# Patient Record
Sex: FEMALE | Race: BLACK OR AFRICAN AMERICAN | NOT HISPANIC OR LATINO | Employment: OTHER | URBAN - METROPOLITAN AREA
[De-identification: names, ages, dates, MRNs, and addresses within clinical notes are randomized per-mention and may not be internally consistent; named-entity substitution may affect disease eponyms.]

---

## 2023-05-15 PROBLEM — E55.9 VITAMIN D DEFICIENCY: Status: ACTIVE | Noted: 2023-05-15

## 2023-05-15 PROBLEM — E11.9 DIABETES MELLITUS (MULTI): Status: ACTIVE | Noted: 2023-05-15

## 2023-05-15 PROBLEM — M79.605 CHRONIC PAIN OF LEFT LOWER EXTREMITY: Status: ACTIVE | Noted: 2023-05-15

## 2023-05-15 PROBLEM — R26.2 AMBULATORY DYSFUNCTION: Status: ACTIVE | Noted: 2023-05-15

## 2023-05-15 PROBLEM — M25.551 HIP PAIN, BILATERAL: Status: ACTIVE | Noted: 2023-05-15

## 2023-05-15 PROBLEM — N28.89 RIGHT RENAL MASS: Status: ACTIVE | Noted: 2023-05-15

## 2023-05-15 PROBLEM — L02.211 ABDOMINAL WALL ABSCESS: Status: ACTIVE | Noted: 2023-05-15

## 2023-05-15 PROBLEM — E78.5 HYPERLIPIDEMIA: Status: ACTIVE | Noted: 2023-05-15

## 2023-05-15 PROBLEM — K59.09 CHRONIC CONSTIPATION: Status: ACTIVE | Noted: 2023-05-15

## 2023-05-15 PROBLEM — L73.2 HIDRADENITIS SUPPURATIVA: Status: ACTIVE | Noted: 2023-05-15

## 2023-05-15 PROBLEM — I10 HYPERTENSION: Status: ACTIVE | Noted: 2023-05-15

## 2023-05-15 PROBLEM — N20.0 CALCULUS OF RENAL PELVIS: Status: ACTIVE | Noted: 2023-05-15

## 2023-05-15 PROBLEM — H40.019 OPEN ANGLE GLAUCOMA SUSPECT WITH BORDERLINE FINDINGS AT LOW RISK: Status: ACTIVE | Noted: 2023-05-15

## 2023-05-15 PROBLEM — N18.9 CKD (CHRONIC KIDNEY DISEASE): Status: ACTIVE | Noted: 2023-05-15

## 2023-05-15 PROBLEM — H25.10 AGE-RELATED NUCLEAR CATARACT: Status: ACTIVE | Noted: 2023-05-15

## 2023-05-15 PROBLEM — N20.0 NEPHROLITHIASIS: Status: ACTIVE | Noted: 2023-05-15

## 2023-05-15 PROBLEM — C64.1 RENAL CELL CARCINOMA, RIGHT (MULTI): Status: ACTIVE | Noted: 2023-05-15

## 2023-05-15 PROBLEM — R26.81 UNSTEADY GAIT: Status: ACTIVE | Noted: 2023-05-15

## 2023-05-15 PROBLEM — M54.50 RIGHT-SIDED LOW BACK PAIN WITHOUT SCIATICA: Status: ACTIVE | Noted: 2023-05-15

## 2023-05-15 PROBLEM — N20.0 URIC ACID RENAL CALCULUS: Status: ACTIVE | Noted: 2023-05-15

## 2023-05-15 PROBLEM — G89.29 CHRONIC PAIN OF LEFT LOWER EXTREMITY: Status: ACTIVE | Noted: 2023-05-15

## 2023-05-15 PROBLEM — E66.01 MORBID OBESITY (MULTI): Status: ACTIVE | Noted: 2023-05-15

## 2023-05-15 PROBLEM — M25.552 HIP PAIN, BILATERAL: Status: ACTIVE | Noted: 2023-05-15

## 2023-05-15 RX ORDER — METFORMIN HYDROCHLORIDE 500 MG/1
1 TABLET ORAL DAILY
COMMUNITY
Start: 2022-04-26 | End: 2023-05-16 | Stop reason: SDUPTHER

## 2023-05-15 RX ORDER — BENZOYL PEROXIDE 100 MG/ML
LIQUID TOPICAL
COMMUNITY
Start: 2022-05-04

## 2023-05-15 RX ORDER — LISINOPRIL 10 MG/1
1 TABLET ORAL DAILY
COMMUNITY
Start: 2017-07-06 | End: 2023-05-16 | Stop reason: SDUPTHER

## 2023-05-15 RX ORDER — DOCUSATE SODIUM 100 MG/1
100 CAPSULE, LIQUID FILLED ORAL 2 TIMES DAILY
COMMUNITY
Start: 2019-08-06 | End: 2023-05-16 | Stop reason: SDUPTHER

## 2023-05-15 RX ORDER — ACETAMINOPHEN 500 MG
50 TABLET ORAL DAILY
COMMUNITY
End: 2023-05-16 | Stop reason: SDUPTHER

## 2023-05-15 RX ORDER — SIMVASTATIN 40 MG/1
1 TABLET, FILM COATED ORAL DAILY
COMMUNITY
Start: 2017-07-06 | End: 2023-05-16 | Stop reason: SDUPTHER

## 2023-05-15 RX ORDER — INSULIN GLARGINE 100 [IU]/ML
10 INJECTION, SOLUTION SUBCUTANEOUS NIGHTLY
COMMUNITY
Start: 2022-03-20 | End: 2023-05-16 | Stop reason: SDUPTHER

## 2023-05-15 RX ORDER — HYDROCHLOROTHIAZIDE 25 MG/1
1 TABLET ORAL DAILY
COMMUNITY
Start: 2017-07-06 | End: 2023-05-16 | Stop reason: SDUPTHER

## 2023-05-15 RX ORDER — ASPIRIN 81 MG/1
1 TABLET ORAL DAILY
COMMUNITY
Start: 2017-07-06 | End: 2023-05-16 | Stop reason: SDUPTHER

## 2023-05-15 RX ORDER — CLINDAMYCIN PHOSPHATE 10 UG/ML
LOTION TOPICAL
COMMUNITY
Start: 2022-05-04

## 2023-05-15 RX ORDER — BLOOD-GLUCOSE METER
1 KIT MISCELLANEOUS 3 TIMES DAILY
COMMUNITY
Start: 2022-03-23 | End: 2023-05-16 | Stop reason: SDUPTHER

## 2023-05-16 ENCOUNTER — OFFICE VISIT (OUTPATIENT)
Dept: PRIMARY CARE | Facility: CLINIC | Age: 63
End: 2023-05-16
Payer: COMMERCIAL

## 2023-05-16 VITALS — WEIGHT: 280 LBS | SYSTOLIC BLOOD PRESSURE: 113 MMHG | DIASTOLIC BLOOD PRESSURE: 78 MMHG | BODY MASS INDEX: 42.57 KG/M2

## 2023-05-16 DIAGNOSIS — Z12.39 SCREENING BREAST EXAMINATION: ICD-10-CM

## 2023-05-16 DIAGNOSIS — E55.9 VITAMIN D DEFICIENCY: ICD-10-CM

## 2023-05-16 DIAGNOSIS — Z13.820 SCREENING FOR OSTEOPOROSIS: Primary | ICD-10-CM

## 2023-05-16 DIAGNOSIS — E11.9 TYPE 2 DIABETES MELLITUS WITHOUT COMPLICATION, WITH LONG-TERM CURRENT USE OF INSULIN (MULTI): ICD-10-CM

## 2023-05-16 DIAGNOSIS — K59.09 CHRONIC CONSTIPATION: ICD-10-CM

## 2023-05-16 DIAGNOSIS — Z79.4 TYPE 2 DIABETES MELLITUS WITHOUT COMPLICATION, WITH LONG-TERM CURRENT USE OF INSULIN (MULTI): ICD-10-CM

## 2023-05-16 DIAGNOSIS — I10 HYPERTENSION, UNSPECIFIED TYPE: ICD-10-CM

## 2023-05-16 DIAGNOSIS — E78.5 HYPERLIPIDEMIA, UNSPECIFIED HYPERLIPIDEMIA TYPE: ICD-10-CM

## 2023-05-16 PROCEDURE — 3074F SYST BP LT 130 MM HG: CPT | Performed by: INTERNAL MEDICINE

## 2023-05-16 PROCEDURE — 4010F ACE/ARB THERAPY RXD/TAKEN: CPT | Performed by: INTERNAL MEDICINE

## 2023-05-16 PROCEDURE — 99214 OFFICE O/P EST MOD 30 MIN: CPT | Performed by: INTERNAL MEDICINE

## 2023-05-16 PROCEDURE — 3078F DIAST BP <80 MM HG: CPT | Performed by: INTERNAL MEDICINE

## 2023-05-16 RX ORDER — INSULIN GLARGINE 100 [IU]/ML
10 INJECTION, SOLUTION SUBCUTANEOUS NIGHTLY
Qty: 9 ML | Refills: 1 | Status: SHIPPED | OUTPATIENT
Start: 2023-05-16 | End: 2023-05-18 | Stop reason: SDUPTHER

## 2023-05-16 RX ORDER — HYDROCHLOROTHIAZIDE 25 MG/1
25 TABLET ORAL DAILY
Qty: 90 TABLET | Refills: 1 | Status: SHIPPED | OUTPATIENT
Start: 2023-05-16 | End: 2023-08-17 | Stop reason: SDUPTHER

## 2023-05-16 RX ORDER — METFORMIN HYDROCHLORIDE 500 MG/1
500 TABLET ORAL DAILY
Qty: 90 TABLET | Refills: 1 | Status: SHIPPED | OUTPATIENT
Start: 2023-05-16 | End: 2023-08-17 | Stop reason: SDUPTHER

## 2023-05-16 RX ORDER — DAPAGLIFLOZIN 10 MG/1
10 TABLET, FILM COATED ORAL DAILY
Qty: 90 TABLET | Refills: 1 | Status: SHIPPED
Start: 2023-05-16 | End: 2023-11-09 | Stop reason: SDUPTHER

## 2023-05-16 RX ORDER — BLOOD-GLUCOSE METER
1 KIT MISCELLANEOUS 3 TIMES DAILY
Qty: 100 STRIP | Refills: 3 | Status: SHIPPED
Start: 2023-05-16 | End: 2023-06-22

## 2023-05-16 RX ORDER — DOCUSATE SODIUM 100 MG/1
100 CAPSULE, LIQUID FILLED ORAL 2 TIMES DAILY
Qty: 60 CAPSULE | Refills: 0 | Status: SHIPPED | OUTPATIENT
Start: 2023-05-16

## 2023-05-16 RX ORDER — DEXTROSE 4 G
1 TABLET,CHEWABLE ORAL 2 TIMES DAILY
Qty: 1 EACH | Refills: 0 | Status: SHIPPED | OUTPATIENT
Start: 2023-05-16

## 2023-05-16 RX ORDER — ACETAMINOPHEN 500 MG
50 TABLET ORAL DAILY
Qty: 90 CAPSULE | Refills: 1 | Status: SHIPPED | OUTPATIENT
Start: 2023-05-16 | End: 2023-08-17 | Stop reason: SDUPTHER

## 2023-05-16 RX ORDER — SIMVASTATIN 40 MG/1
40 TABLET, FILM COATED ORAL DAILY
Qty: 90 TABLET | Refills: 1 | Status: SHIPPED | OUTPATIENT
Start: 2023-05-16 | End: 2023-08-02 | Stop reason: SDUPTHER

## 2023-05-16 RX ORDER — ASPIRIN 81 MG/1
81 TABLET ORAL DAILY
Qty: 90 TABLET | Refills: 1 | Status: SHIPPED | OUTPATIENT
Start: 2023-05-16 | End: 2023-08-17 | Stop reason: SDUPTHER

## 2023-05-16 RX ORDER — LISINOPRIL 10 MG/1
10 TABLET ORAL DAILY
Qty: 90 TABLET | Refills: 1 | Status: SHIPPED | OUTPATIENT
Start: 2023-05-16 | End: 2023-08-17 | Stop reason: SDUPTHER

## 2023-05-16 ASSESSMENT — ENCOUNTER SYMPTOMS
ABDOMINAL DISTENTION: 0
ABDOMINAL PAIN: 0
ACTIVITY CHANGE: 0
CONFUSION: 0
COUGH: 0
WHEEZING: 0
FATIGUE: 0
DIARRHEA: 0
VOMITING: 0
LIGHT-HEADEDNESS: 0
SHORTNESS OF BREATH: 0
PALPITATIONS: 0
DIZZINESS: 0
FEVER: 0
NAUSEA: 0
WEAKNESS: 0
AGITATION: 0

## 2023-05-16 NOTE — PROGRESS NOTES
Subjective   Kelsey Rai is a 63 y.o. female with PMH of IDDM2, HTN, right renal mass s/p nephrectomy, recurrent renal stones, and HLD who presents for Med Refill.    The patient is being seen for the initial annual wellness visit and follow up.  Past Medical, Surgical and Family History: reviewed and updated in chart.   Interval History: Patient has not been hospitalized previously.   Medications and Supplements: Review of all medications by a prescribing practitioner or clinical pharmacist (such as prescriptions, OTCs, herbal therapies and supplements) documented in the medical record.    No, the patient is not using opioids.   Health Risk Assessment:. Paper HRA completed by patient and scanned into chart.   Depression/Suicide Screening:  .   Done  No falls in the past year.  No recent hospitalizations.  Advance care planning completed.     Patient states that she is doing well overall with no complaints. Highest sugars at home have been in the 190s. Not checking BP at home.     Med Refill  Pertinent negatives include no abdominal pain, chest pain, coughing, fatigue, fever, nausea, vomiting or weakness.       Review of Systems   Constitutional:  Negative for activity change, fatigue and fever.   Respiratory:  Negative for cough, shortness of breath and wheezing.    Cardiovascular:  Negative for chest pain, palpitations and leg swelling.   Gastrointestinal:  Negative for abdominal distention, abdominal pain, diarrhea, nausea and vomiting.   Neurological:  Negative for dizziness, weakness and light-headedness.   Psychiatric/Behavioral:  Negative for agitation and confusion.        Objective   Physical Exam  Constitutional:       General: She is not in acute distress.     Appearance: Normal appearance.   Cardiovascular:      Rate and Rhythm: Normal rate and regular rhythm.      Heart sounds: Normal heart sounds. No murmur heard.     No friction rub. No gallop.   Pulmonary:      Effort: Pulmonary effort is  "normal. No respiratory distress.      Breath sounds: Normal breath sounds. No wheezing, rhonchi or rales.   Abdominal:      General: Abdomen is flat. Bowel sounds are normal. There is no distension.      Palpations: Abdomen is soft.      Tenderness: There is no abdominal tenderness.   Musculoskeletal:         General: No swelling or tenderness. Normal range of motion.   Skin:     General: Skin is warm and dry.      Findings: No erythema or rash.   Neurological:      General: No focal deficit present.      Mental Status: She is alert and oriented to person, place, and time. Mental status is at baseline.   Psychiatric:         Mood and Affect: Mood normal.         Behavior: Behavior normal.         Assessment/Plan   Problem List Items Addressed This Visit          Circulatory    Hypertension     - BP today in office 113/78   - Continue home meds   - Check CMP + urine albumin          Relevant Medications    aspirin 81 mg EC tablet    hydroCHLOROthiazide (HYDRODiuril) 25 mg tablet    lisinopril 10 mg tablet    Other Relevant Orders    Lipid Panel    Comprehensive Metabolic Panel    Albumin, urine, random       Digestive    Chronic constipation    Relevant Medications    docusate sodium (Colace) 100 mg capsule       Endocrine/Metabolic    Diabetes mellitus (CMS/HCC)     - Check HbA1c   - Continue Lantus 10 units at night   - Continue Metformin 500mg once daily          Relevant Medications    FreeStyle Lite Strips strip    insulin glargine (Lantus U-100 Insulin) 100 unit/mL injection    metFORMIN (Glucophage) 500 mg tablet    dapagliflozin (Farxiga) 10 mg    blood-glucose meter (OneTouch Verio Meter) misc    insulin syringe,safetyneedle 29G X 1/2\" 0.5 mL syringe    Other Relevant Orders    Hemoglobin A1C    Vitamin D deficiency    Relevant Medications    cholecalciferol (Vitamin D-3) 50 mcg (2,000 unit) capsule       Other    Hyperlipidemia     - Check CMP + lipid panel   - Continue home meds          Relevant " Medications    simvastatin (Zocor) 40 mg tablet    Other Relevant Orders    Lipid Panel    Comprehensive Metabolic Panel     Other Visit Diagnoses       Screening for osteoporosis    -  Primary    Relevant Orders    XR DEXA bone density    Screening breast examination        Relevant Orders    BI mammo bilateral screening tomosynthesis          # Health Maintenance   - Last colonoscopy 2014 and recommended repeat in 8 years, pt wants to defer until 2024   - Last mammogram 2022, referral given   - DEXA scan referral given   - Check HbA1c, CMP, lipids, urine albumin

## 2023-05-17 DIAGNOSIS — E11.9 TYPE 2 DIABETES MELLITUS WITHOUT COMPLICATION, WITH LONG-TERM CURRENT USE OF INSULIN (MULTI): ICD-10-CM

## 2023-05-17 DIAGNOSIS — E66.01 MORBID OBESITY (MULTI): Primary | ICD-10-CM

## 2023-05-17 DIAGNOSIS — Z79.4 TYPE 2 DIABETES MELLITUS WITHOUT COMPLICATION, WITH LONG-TERM CURRENT USE OF INSULIN (MULTI): ICD-10-CM

## 2023-05-17 NOTE — TELEPHONE ENCOUNTER
Insulin was changed to a pen instead of the vial she is used to would like to talk to you about it

## 2023-05-18 DIAGNOSIS — E11.9 TYPE 2 DIABETES MELLITUS WITHOUT COMPLICATION, WITH LONG-TERM CURRENT USE OF INSULIN (MULTI): ICD-10-CM

## 2023-05-18 DIAGNOSIS — Z79.4 TYPE 2 DIABETES MELLITUS WITHOUT COMPLICATION, WITH LONG-TERM CURRENT USE OF INSULIN (MULTI): ICD-10-CM

## 2023-05-18 RX ORDER — INSULIN GLARGINE 100 [IU]/ML
12 INJECTION, SOLUTION SUBCUTANEOUS NIGHTLY
Qty: 12 ML | Refills: 1 | Status: SHIPPED | OUTPATIENT
Start: 2023-05-18 | End: 2023-07-21

## 2023-05-23 DIAGNOSIS — Z79.4 TYPE 2 DIABETES MELLITUS WITH CHRONIC KIDNEY DISEASE, WITH LONG-TERM CURRENT USE OF INSULIN, UNSPECIFIED CKD STAGE (MULTI): Primary | ICD-10-CM

## 2023-05-23 DIAGNOSIS — E11.22 TYPE 2 DIABETES MELLITUS WITH CHRONIC KIDNEY DISEASE, WITH LONG-TERM CURRENT USE OF INSULIN, UNSPECIFIED CKD STAGE (MULTI): Primary | ICD-10-CM

## 2023-05-23 RX ORDER — INSULIN PUMP SYRINGE, 3 ML
EACH MISCELLANEOUS
Qty: 1 EACH | Refills: 0 | Status: SHIPPED
Start: 2023-05-23 | End: 2023-06-22

## 2023-05-23 RX ORDER — LANCETS
EACH MISCELLANEOUS
Qty: 100 EACH | Refills: 1 | Status: SHIPPED | OUTPATIENT
Start: 2023-05-23 | End: 2023-08-17 | Stop reason: SDUPTHER

## 2023-05-23 RX ORDER — LANCETS 26 GAUGE
EACH MISCELLANEOUS
Qty: 1 EACH | Refills: 0 | Status: SHIPPED | OUTPATIENT
Start: 2023-05-23 | End: 2024-05-22

## 2023-06-22 DIAGNOSIS — E11.9 TYPE 2 DIABETES MELLITUS WITHOUT COMPLICATION, WITH LONG-TERM CURRENT USE OF INSULIN (MULTI): Primary | ICD-10-CM

## 2023-06-22 DIAGNOSIS — Z79.4 TYPE 2 DIABETES MELLITUS WITHOUT COMPLICATION, WITH LONG-TERM CURRENT USE OF INSULIN (MULTI): Primary | ICD-10-CM

## 2023-06-22 RX ORDER — BLOOD-GLUCOSE METER
EACH MISCELLANEOUS
Qty: 200 STRIP | Refills: 1 | Status: SHIPPED | OUTPATIENT
Start: 2023-06-22 | End: 2023-11-21

## 2023-06-22 RX ORDER — BLOOD-GLUCOSE METER
EACH MISCELLANEOUS
COMMUNITY
Start: 2022-08-12 | End: 2023-06-22 | Stop reason: SDUPTHER

## 2023-07-06 LAB
CREATININE (MG/DL) IN SER/PLAS: 1.29 MG/DL (ref 0.5–1.05)
GFR FEMALE: 46 ML/MIN/1.73M2
UREA NITROGEN (MG/DL) IN SER/PLAS: 25 MG/DL (ref 6–23)

## 2023-07-21 DIAGNOSIS — E11.9 TYPE 2 DIABETES MELLITUS WITHOUT COMPLICATION, WITH LONG-TERM CURRENT USE OF INSULIN (MULTI): ICD-10-CM

## 2023-07-21 DIAGNOSIS — Z79.4 TYPE 2 DIABETES MELLITUS WITHOUT COMPLICATION, WITH LONG-TERM CURRENT USE OF INSULIN (MULTI): ICD-10-CM

## 2023-07-21 RX ORDER — INSULIN GLARGINE 100 [IU]/ML
INJECTION, SOLUTION SUBCUTANEOUS
Qty: 12 ML | Refills: 1 | Status: SHIPPED | OUTPATIENT
Start: 2023-07-21 | End: 2023-08-17 | Stop reason: SDUPTHER

## 2023-08-02 DIAGNOSIS — E78.5 HYPERLIPIDEMIA, UNSPECIFIED HYPERLIPIDEMIA TYPE: ICD-10-CM

## 2023-08-03 RX ORDER — SIMVASTATIN 40 MG/1
40 TABLET, FILM COATED ORAL DAILY
Qty: 90 TABLET | Refills: 1 | Status: SHIPPED | OUTPATIENT
Start: 2023-08-03 | End: 2023-08-17 | Stop reason: SDUPTHER

## 2023-08-17 ENCOUNTER — OFFICE VISIT (OUTPATIENT)
Dept: PRIMARY CARE | Facility: CLINIC | Age: 63
End: 2023-08-17
Payer: COMMERCIAL

## 2023-08-17 VITALS — SYSTOLIC BLOOD PRESSURE: 104 MMHG | WEIGHT: 278 LBS | DIASTOLIC BLOOD PRESSURE: 72 MMHG | BODY MASS INDEX: 42.27 KG/M2

## 2023-08-17 DIAGNOSIS — E55.9 VITAMIN D DEFICIENCY: ICD-10-CM

## 2023-08-17 DIAGNOSIS — E11.22 TYPE 2 DIABETES MELLITUS WITH CHRONIC KIDNEY DISEASE, WITH LONG-TERM CURRENT USE OF INSULIN, UNSPECIFIED CKD STAGE (MULTI): ICD-10-CM

## 2023-08-17 DIAGNOSIS — E11.9 TYPE 2 DIABETES MELLITUS WITHOUT COMPLICATION, WITH LONG-TERM CURRENT USE OF INSULIN (MULTI): ICD-10-CM

## 2023-08-17 DIAGNOSIS — I10 HYPERTENSION, UNSPECIFIED TYPE: ICD-10-CM

## 2023-08-17 DIAGNOSIS — E78.5 HYPERLIPIDEMIA, UNSPECIFIED HYPERLIPIDEMIA TYPE: ICD-10-CM

## 2023-08-17 DIAGNOSIS — Z79.4 TYPE 2 DIABETES MELLITUS WITHOUT COMPLICATION, WITH LONG-TERM CURRENT USE OF INSULIN (MULTI): ICD-10-CM

## 2023-08-17 DIAGNOSIS — Z79.4 TYPE 2 DIABETES MELLITUS WITH CHRONIC KIDNEY DISEASE, WITH LONG-TERM CURRENT USE OF INSULIN, UNSPECIFIED CKD STAGE (MULTI): ICD-10-CM

## 2023-08-17 PROCEDURE — 4010F ACE/ARB THERAPY RXD/TAKEN: CPT | Performed by: INTERNAL MEDICINE

## 2023-08-17 PROCEDURE — G0439 PPPS, SUBSEQ VISIT: HCPCS | Performed by: INTERNAL MEDICINE

## 2023-08-17 PROCEDURE — 3078F DIAST BP <80 MM HG: CPT | Performed by: INTERNAL MEDICINE

## 2023-08-17 PROCEDURE — 3074F SYST BP LT 130 MM HG: CPT | Performed by: INTERNAL MEDICINE

## 2023-08-17 PROCEDURE — 99214 OFFICE O/P EST MOD 30 MIN: CPT | Performed by: INTERNAL MEDICINE

## 2023-08-17 RX ORDER — INSULIN GLARGINE 100 [IU]/ML
12 INJECTION, SOLUTION SUBCUTANEOUS NIGHTLY
Qty: 12 ML | Refills: 1 | Status: SHIPPED | OUTPATIENT
Start: 2023-08-17 | End: 2023-08-17 | Stop reason: SDUPTHER

## 2023-08-17 RX ORDER — SIMVASTATIN 40 MG/1
40 TABLET, FILM COATED ORAL DAILY
Qty: 90 TABLET | Refills: 1 | Status: SHIPPED | OUTPATIENT
Start: 2023-08-17 | End: 2023-08-17 | Stop reason: SDUPTHER

## 2023-08-17 RX ORDER — ASPIRIN 81 MG/1
81 TABLET ORAL DAILY
Qty: 90 TABLET | Refills: 1 | Status: SHIPPED | OUTPATIENT
Start: 2023-08-17 | End: 2023-12-15 | Stop reason: SDUPTHER

## 2023-08-17 RX ORDER — LANCETS
EACH MISCELLANEOUS
Qty: 100 EACH | Refills: 1 | Status: SHIPPED | OUTPATIENT
Start: 2023-08-17 | End: 2023-08-17 | Stop reason: SDUPTHER

## 2023-08-17 RX ORDER — SIMVASTATIN 40 MG/1
40 TABLET, FILM COATED ORAL DAILY
Qty: 90 TABLET | Refills: 1 | Status: SHIPPED
Start: 2023-08-17 | End: 2023-09-12

## 2023-08-17 RX ORDER — HYDROCHLOROTHIAZIDE 25 MG/1
25 TABLET ORAL DAILY
Qty: 90 TABLET | Refills: 1 | Status: SHIPPED | OUTPATIENT
Start: 2023-08-17 | End: 2023-08-17 | Stop reason: SDUPTHER

## 2023-08-17 RX ORDER — PEN NEEDLE, DIABETIC 30 GX3/16"
NEEDLE, DISPOSABLE MISCELLANEOUS
Qty: 100 EACH | Refills: 1 | Status: SHIPPED | OUTPATIENT
Start: 2023-08-17 | End: 2023-10-10 | Stop reason: SDUPTHER

## 2023-08-17 RX ORDER — LANCETS
EACH MISCELLANEOUS
Qty: 100 EACH | Refills: 1 | Status: SHIPPED
Start: 2023-08-17 | End: 2024-02-09 | Stop reason: WASHOUT

## 2023-08-17 RX ORDER — INSULIN GLARGINE 100 [IU]/ML
12 INJECTION, SOLUTION SUBCUTANEOUS NIGHTLY
Qty: 12 ML | Refills: 1 | Status: SHIPPED | OUTPATIENT
Start: 2023-08-17 | End: 2023-10-10 | Stop reason: SDUPTHER

## 2023-08-17 RX ORDER — INSULIN GLARGINE 100 [IU]/ML
12 INJECTION, SOLUTION SUBCUTANEOUS NIGHTLY
Qty: 12 ML | Refills: 1 | Status: SHIPPED
Start: 2023-08-17 | End: 2023-08-17 | Stop reason: WASHOUT

## 2023-08-17 RX ORDER — METFORMIN HYDROCHLORIDE 500 MG/1
500 TABLET ORAL DAILY
Qty: 90 TABLET | Refills: 1 | Status: SHIPPED
Start: 2023-08-17 | End: 2023-11-09 | Stop reason: ALTCHOICE

## 2023-08-17 RX ORDER — LISINOPRIL 10 MG/1
10 TABLET ORAL DAILY
Qty: 90 TABLET | Refills: 1 | Status: SHIPPED | OUTPATIENT
Start: 2023-08-17 | End: 2023-12-15 | Stop reason: SDUPTHER

## 2023-08-17 RX ORDER — ACETAMINOPHEN 500 MG
50 TABLET ORAL DAILY
Qty: 90 CAPSULE | Refills: 1 | Status: SHIPPED | OUTPATIENT
Start: 2023-08-17 | End: 2023-08-17 | Stop reason: SDUPTHER

## 2023-08-17 RX ORDER — LISINOPRIL 10 MG/1
10 TABLET ORAL DAILY
Qty: 90 TABLET | Refills: 1 | Status: SHIPPED | OUTPATIENT
Start: 2023-08-17 | End: 2023-08-17 | Stop reason: SDUPTHER

## 2023-08-17 RX ORDER — METFORMIN HYDROCHLORIDE 500 MG/1
500 TABLET ORAL DAILY
Qty: 90 TABLET | Refills: 1 | Status: SHIPPED | OUTPATIENT
Start: 2023-08-17 | End: 2023-08-17 | Stop reason: SDUPTHER

## 2023-08-17 RX ORDER — ASPIRIN 81 MG/1
81 TABLET ORAL DAILY
Qty: 90 TABLET | Refills: 1 | Status: SHIPPED | OUTPATIENT
Start: 2023-08-17 | End: 2023-08-17 | Stop reason: SDUPTHER

## 2023-08-17 RX ORDER — ACETAMINOPHEN 500 MG
50 TABLET ORAL DAILY
Qty: 90 CAPSULE | Refills: 1 | Status: SHIPPED | OUTPATIENT
Start: 2023-08-17 | End: 2023-12-15 | Stop reason: SDUPTHER

## 2023-08-17 RX ORDER — HYDROCHLOROTHIAZIDE 25 MG/1
25 TABLET ORAL DAILY
Qty: 90 TABLET | Refills: 1 | Status: SHIPPED | OUTPATIENT
Start: 2023-08-17 | End: 2023-12-15 | Stop reason: SDUPTHER

## 2023-08-17 ASSESSMENT — ENCOUNTER SYMPTOMS
OCCASIONAL FEELINGS OF UNSTEADINESS: 0
LOSS OF SENSATION IN FEET: 0
DEPRESSION: 0

## 2023-08-17 NOTE — PROGRESS NOTES
Subjective   Patient ID: Kelsey Rai is a 63 y.o. female who presents for Follow-up (Needs rx for pen needles for  lantus pen).    The patient is being seen for the subsequent annual wellness visit and follow up.  Past Medical, Surgical and Family History: reviewed and updated in chart.   Interval History: Patient has not been hospitalized previously.   Medications and Supplements: Review of all medications by a prescribing practitioner or clinical pharmacist (such as prescriptions, OTCs, herbal therapies and supplements) documented in the medical record.    No, the patient is not using opioids.   Health Risk Assessment:. Paper HRA completed by patient and scanned into chart.   Depression/Suicide Screening:  .   Done  No falls in the past year.  No recent hospitalizations.  Advance care planning completed.     HPI  The pt is a 63 year old female with a pmh of T2DM, HTN, HLD, rt renal mass s/p nephrectomy, recurrent nephrolithiasis, and CKD stage 3a who presented to the clinic today for refill and follow up. She has no new complaints today.     She reports no hypoglycemic episodes at home, blood sugar test at home is 121-160s. She however does not check blood pressures at home, BP has been relatively controlled on medications. She has appointment with podiatrist on the 8/21 and has regularly ophthalmology visits.  The patient denies headaches, lightheadedness, changes in speech/vision, photophobia, dysphagia, diaphoresis, Chest pain, dyspnea, Abdominal pain, recent falls or trauma, and changes in bowel/urinary habits.    Allergies   Allergen Reactions    Banana Hives and Itching    Latex Unknown    Penicillins Itching     tolerated ceftriaxone 3/16/22. See allergy assessment from 3/18/2022      Current Outpatient Medications on File Prior to Visit   Medication Sig Dispense Refill    Autolet lancing device Use as instructed 1 each 0    benzoyl peroxide (Benzac AC) 10 % external wash apply to affected areas on  "body, let sit for 2-3 minutes and then rinse off. May bleach fabrics. Use daily when in the shower.      blood sugar diagnostic (OneTouch Verio test strips) strip TEST TWO TIMES A  strip 1    blood-glucose meter (OneTouch Verio Meter) misc 1 each 2 times a day. 1 each 0    clindamycin (Cleocin T) 1 % lotion apply to affected areas under the breasts, groin area and buttocks twice daily      dapagliflozin (Farxiga) 10 mg Take 1 tablet (10 mg) by mouth once daily. 90 tablet 1    docusate sodium (Colace) 100 mg capsule Take 1 capsule (100 mg) by mouth 2 times a day. 60 capsule 0    [DISCONTINUED] aspirin 81 mg EC tablet Take 1 tablet (81 mg) by mouth once daily. 90 tablet 1    [DISCONTINUED] cholecalciferol (Vitamin D-3) 50 mcg (2,000 unit) capsule Take 1 capsule (50 mcg) by mouth early in the morning.. 90 capsule 1    [DISCONTINUED] hydroCHLOROthiazide (HYDRODiuril) 25 mg tablet Take 1 tablet (25 mg) by mouth once daily. 90 tablet 1    [DISCONTINUED] insulin syringe,safetyneedle 29G X 1/2\" 0.5 mL syringe Use to inject 1-4 times daily as directed. 100 each 6    [DISCONTINUED] lancets misc Test blood sugar twice daily 100 each 1    [DISCONTINUED] Lantus U-100 Insulin 100 unit/mL injection INJECT 12 UNITS UNDER THE SKIN ONCE DAILY AT BEDTIME 12 mL 1    [DISCONTINUED] lisinopril 10 mg tablet Take 1 tablet (10 mg) by mouth once daily. 90 tablet 1    [DISCONTINUED] metFORMIN (Glucophage) 500 mg tablet Take 1 tablet (500 mg) by mouth once daily. 90 tablet 1    [DISCONTINUED] simvastatin (Zocor) 40 mg tablet Take 1 tablet (40 mg) by mouth once daily. 90 tablet 1     No current facility-administered medications on file prior to visit.      Social History    Tobacco Use      Smoking status: Former        Types: Cigarettes        Quit date:         Years since quittin.6      Smokeless tobacco: Never  .  reports no history of alcohol use..  reports no history of drug use..     Review of Systems  12 points review of " system is negative except as stated in the HPI above.    Objective   /72   Wt 126 kg (278 lb)   BMI 42.27 kg/m²     Physical Exam  GE; sitting comfortably in a chair, in NAD  HEENT; AT/NC, no conjunctival pallor, anicteric sclera  Neck; Supple neck, no LAD/JVD  Chest; CTAbl, no adventitious sounds  CVS; s1 and s2 only no MGR, RRR  Abd; soft, NT/ND, normoactive BS  Ext; no cyanosis/clubbing/edema, pulses intact  Neuro; Aox3  Psych; normal mood    [unfilled]  Lab Results   Component Value Date    HGB 12.7 11/22/2022    HGB 12.7 07/27/2022    MCV 92 11/22/2022    MCV 90 07/27/2022     11/22/2022     07/27/2022    BUN 25 (H) 07/06/2023    BUN 19 11/22/2022    CREATININE 1.29 (H) 07/06/2023    CREATININE 1.13 (H) 11/22/2022    TSH 0.70 04/23/2022    VITD25 55 04/23/2022         Assessment/Plan   he pt is a 63 year old female with a pmh of T2DM, HTN, HLD, rt renal mass s/p nephrectomy, recurrent nephrolithiasis, and CKD stage 3a who came in for a routine followup and refill of medications.    For DM  Home blood sugar measurements 120-160s  No hypoglycemic episodes  Continue lantus 12 at night, continue metformin  Continue diet and exercise  Ordered A1c, will review results     For HLD  Continue simvastatin 40  Ordered Lipid panel  Continue diet and exercise regimen for wt loss    For CKD stage 3a, renal mass s/p nephrectomy  sCr 1.29, GFR 46  Avoid nephrotoxic medications including NSAIDS  Encourage liberal intake of fluid, no salt    For HTN  Bp today 104/72  No home bp measurement, usually wnl  Continue hydrochlorothiazide 25, lisinopril 10, ASA 81    HM  Had colonoscopy 2014, next time 2024  Mammography done 7/18 wnl  Dexa done this year, normal findings    Outstanding labs; A1c, CMP, Lipid panel, UA. Asked to get this done, will review results at the next appointment.     Problem List Items Addressed This Visit       Diabetes mellitus (CMS/[facility])    Relevant Medications    insulin glargine (Lantus  "U-100 Insulin) 100 unit/mL injection    insulin syringe,safetyneedle 29G X 1/2\" 0.5 mL syringe    lancets misc    metFORMIN (Glucophage) 500 mg tablet    Hyperlipidemia    Relevant Medications    simvastatin (Zocor) 40 mg tablet    Hypertension    Relevant Medications    aspirin 81 mg EC tablet    hydroCHLOROthiazide (HYDRODiuril) 25 mg tablet    lisinopril 10 mg tablet    Vitamin D deficiency    Relevant Medications    cholecalciferol (Vitamin D-3) 50 mcg (2,000 unit) capsule              Manjeet Donis MD  IM Resident.  "

## 2023-09-01 ENCOUNTER — LAB (OUTPATIENT)
Dept: LAB | Facility: LAB | Age: 63
End: 2023-09-01
Payer: COMMERCIAL

## 2023-09-01 DIAGNOSIS — E11.9 TYPE 2 DIABETES MELLITUS WITHOUT COMPLICATION, WITH LONG-TERM CURRENT USE OF INSULIN (MULTI): ICD-10-CM

## 2023-09-01 DIAGNOSIS — Z79.4 TYPE 2 DIABETES MELLITUS WITHOUT COMPLICATION, WITH LONG-TERM CURRENT USE OF INSULIN (MULTI): ICD-10-CM

## 2023-09-01 DIAGNOSIS — I10 HYPERTENSION, UNSPECIFIED TYPE: ICD-10-CM

## 2023-09-01 DIAGNOSIS — E78.5 HYPERLIPIDEMIA, UNSPECIFIED HYPERLIPIDEMIA TYPE: ICD-10-CM

## 2023-09-01 LAB
ALANINE AMINOTRANSFERASE (SGPT) (U/L) IN SER/PLAS: 9 U/L (ref 7–45)
ALBUMIN (G/DL) IN SER/PLAS: 3.8 G/DL (ref 3.4–5)
ALBUMIN (MG/L) IN URINE: 10.4 MG/L
ALBUMIN/CREATININE (UG/MG) IN URINE: 7.6 UG/MG CRT (ref 0–30)
ALKALINE PHOSPHATASE (U/L) IN SER/PLAS: 55 U/L (ref 33–136)
ANION GAP IN SER/PLAS: 14 MMOL/L (ref 10–20)
ASPARTATE AMINOTRANSFERASE (SGOT) (U/L) IN SER/PLAS: 11 U/L (ref 9–39)
BILIRUBIN TOTAL (MG/DL) IN SER/PLAS: 0.5 MG/DL (ref 0–1.2)
CALCIUM (MG/DL) IN SER/PLAS: 9.6 MG/DL (ref 8.6–10.6)
CARBON DIOXIDE, TOTAL (MMOL/L) IN SER/PLAS: 28 MMOL/L (ref 21–32)
CHLORIDE (MMOL/L) IN SER/PLAS: 102 MMOL/L (ref 98–107)
CHOLESTEROL (MG/DL) IN SER/PLAS: 151 MG/DL (ref 0–199)
CHOLESTEROL IN HDL (MG/DL) IN SER/PLAS: 36 MG/DL
CHOLESTEROL/HDL RATIO: 4.2
CREATININE (MG/DL) IN SER/PLAS: 1.24 MG/DL (ref 0.5–1.05)
CREATININE (MG/DL) IN URINE: 137 MG/DL (ref 20–320)
ESTIMATED AVERAGE GLUCOSE FOR HBA1C: 180 MG/DL
GFR FEMALE: 48 ML/MIN/1.73M2
GLUCOSE (MG/DL) IN SER/PLAS: 150 MG/DL (ref 74–99)
HEMOGLOBIN A1C/HEMOGLOBIN TOTAL IN BLOOD: 7.9 %
LDL: 91 MG/DL (ref 0–99)
POTASSIUM (MMOL/L) IN SER/PLAS: 4.1 MMOL/L (ref 3.5–5.3)
PROTEIN TOTAL: 7.5 G/DL (ref 6.4–8.2)
SODIUM (MMOL/L) IN SER/PLAS: 140 MMOL/L (ref 136–145)
TRIGLYCERIDE (MG/DL) IN SER/PLAS: 120 MG/DL (ref 0–149)
UREA NITROGEN (MG/DL) IN SER/PLAS: 17 MG/DL (ref 6–23)
VLDL: 24 MG/DL (ref 0–40)

## 2023-09-01 PROCEDURE — 83036 HEMOGLOBIN GLYCOSYLATED A1C: CPT

## 2023-09-01 PROCEDURE — 80061 LIPID PANEL: CPT

## 2023-09-01 PROCEDURE — 36415 COLL VENOUS BLD VENIPUNCTURE: CPT

## 2023-09-01 PROCEDURE — 82570 ASSAY OF URINE CREATININE: CPT

## 2023-09-01 PROCEDURE — 80053 COMPREHEN METABOLIC PANEL: CPT

## 2023-09-01 PROCEDURE — 82043 UR ALBUMIN QUANTITATIVE: CPT

## 2023-09-05 DIAGNOSIS — E11.22 TYPE 2 DIABETES MELLITUS WITH CHRONIC KIDNEY DISEASE, WITH LONG-TERM CURRENT USE OF INSULIN, UNSPECIFIED CKD STAGE (MULTI): ICD-10-CM

## 2023-09-05 DIAGNOSIS — Z79.4 TYPE 2 DIABETES MELLITUS WITH CHRONIC KIDNEY DISEASE, WITH LONG-TERM CURRENT USE OF INSULIN, UNSPECIFIED CKD STAGE (MULTI): ICD-10-CM

## 2023-09-05 RX ORDER — LANCETS 33 GAUGE
EACH MISCELLANEOUS
Qty: 100 EACH | Refills: 1 | Status: SHIPPED | OUTPATIENT
Start: 2023-09-05 | End: 2024-03-22 | Stop reason: SDUPTHER

## 2023-09-12 DIAGNOSIS — E11.9 TYPE 2 DIABETES MELLITUS WITHOUT COMPLICATION, WITH LONG-TERM CURRENT USE OF INSULIN (MULTI): Primary | ICD-10-CM

## 2023-09-12 DIAGNOSIS — Z79.4 TYPE 2 DIABETES MELLITUS WITHOUT COMPLICATION, WITH LONG-TERM CURRENT USE OF INSULIN (MULTI): Primary | ICD-10-CM

## 2023-09-12 DIAGNOSIS — E78.5 HYPERLIPIDEMIA, UNSPECIFIED HYPERLIPIDEMIA TYPE: ICD-10-CM

## 2023-09-12 RX ORDER — ATORVASTATIN CALCIUM 80 MG/1
80 TABLET, FILM COATED ORAL DAILY
Qty: 30 TABLET | Refills: 5 | Status: SHIPPED
Start: 2023-09-12 | End: 2023-09-19 | Stop reason: SINTOL

## 2023-09-14 ENCOUNTER — TELEPHONE (OUTPATIENT)
Dept: PRIMARY CARE | Facility: CLINIC | Age: 63
End: 2023-09-14
Payer: COMMERCIAL

## 2023-09-18 ENCOUNTER — TELEPHONE (OUTPATIENT)
Dept: PRIMARY CARE | Facility: CLINIC | Age: 63
End: 2023-09-18
Payer: COMMERCIAL

## 2023-09-18 NOTE — TELEPHONE ENCOUNTER
[facility] CALLED.   PATIENT IS TAKING A STATIN AND HER DOSE WAS RECENTLY INCREASED.   tHE PILL IS TOO BIG AND SHE IS HAVING DIFFICULTY SWALLOWING.  What to do?

## 2023-09-19 DIAGNOSIS — E78.5 HYPERLIPIDEMIA, UNSPECIFIED HYPERLIPIDEMIA TYPE: Primary | ICD-10-CM

## 2023-09-19 RX ORDER — SIMVASTATIN 40 MG/1
1 TABLET, FILM COATED ORAL DAILY
COMMUNITY
Start: 2017-07-06 | End: 2023-09-19 | Stop reason: SDUPTHER

## 2023-09-19 RX ORDER — SIMVASTATIN 40 MG/1
40 TABLET, FILM COATED ORAL DAILY
Qty: 90 TABLET | Refills: 1 | Status: SHIPPED
Start: 2023-09-19 | End: 2024-02-09 | Stop reason: ALTCHOICE

## 2023-09-27 ENCOUNTER — TELEMEDICINE (OUTPATIENT)
Dept: PHARMACY | Facility: HOSPITAL | Age: 63
End: 2023-09-27
Payer: COMMERCIAL

## 2023-09-27 DIAGNOSIS — Z79.4 TYPE 2 DIABETES MELLITUS WITHOUT COMPLICATION, WITH LONG-TERM CURRENT USE OF INSULIN (MULTI): ICD-10-CM

## 2023-09-27 DIAGNOSIS — E11.9 TYPE 2 DIABETES MELLITUS WITHOUT COMPLICATION, WITH LONG-TERM CURRENT USE OF INSULIN (MULTI): ICD-10-CM

## 2023-09-27 ASSESSMENT — ENCOUNTER SYMPTOMS: DIABETIC ASSOCIATED SYMPTOMS: 0

## 2023-09-27 NOTE — Clinical Note
Discussed potential start of Trulicity. She would like to discuss with her daughter. F/U with pharm 2 weeks; PCP 12/4

## 2023-09-27 NOTE — PROGRESS NOTES
Pharmacist Clinic: Diabetes Management  Kelsey Rai is a 63 y.o. female was referred to Clinical Pharmacy Team for diabetes management.   Referring Provider: Ora Ramirez,      Diabetes  She presents for her initial diabetic visit. She has type 2 diabetes mellitus. Her disease course has been stable. There are no hypoglycemic associated symptoms. There are no diabetic associated symptoms. Risk factors for coronary artery disease include diabetes mellitus, dyslipidemia, hypertension and obesity.     HISTORY OF PRESENT ILLNESS  Approximate Date of Diagnosis: Does not recall  Reports no known complications due to diabetes. Does have h/o CKD, renal mass s/p nephrectomy.     Pertinent PMH Review:  - PMH of Pancreatitis: no  - PMH/FH of Medullary Thyroid Cancer: no  - PMH of Retinopathy: no  - PMH of Urinary Tract Infections: no    Diet: 2-3 meals/day. Dinner is largest meal -chicken and veggie  Limits snacks and sweets.  Drinks lemon water. Avoids soda.    Denies tobacco or alcohol use    LAB REVIEW   Lab Results   Component Value Date    CREATININE 1.24 (H) 09/01/2023      Lab Results   Component Value Date    HGBA1C 7.9 (A) 09/01/2023    HGBA1C 8.2 (A) 11/22/2022    HGBA1C 7.5 (A) 03/24/2022     Lab Results   Component Value Date    TRIG 120 09/01/2023    TRIG 146 11/22/2022    TRIG 111 08/20/2021     The 10-year ASCVD risk score (Zenia BARILLAS, et al., 2019) is: 11.1%    Values used to calculate the score:      Age: 63 years      Sex: Female      Is Non- : Yes      Diabetic: Yes      Tobacco smoker: No      Systolic Blood Pressure: 104 mmHg      Is BP treated: Yes      HDL Cholesterol: 36 mg/dL      Total Cholesterol: 151 mg/dL     DIABETES ASSESSMENT  CURRENT PHARMACOTHERAPY  - Farxiga 10mg once daily   - Metformin 500mg once daily  - Lantus 12 units once daily    Allergies: Banana, Latex, and Penicillins     SECONDARY PREVENTION  - Statin? Yes  - ACE-I/ARB? Yes    HISTORICAL  PHARMACOTHERAPY  - None    SMBG MEASUREMENTS  Patient is using: glucometer Testing twice daily in AM and before dinner usual range 134-154.  Denies readings <80 or >200.  Patient does not report symptoms of hypoglycemia.   Patient does not report symptoms of hyperglycemia.     AFFORDABILITY/ADHERENCE   - Denies issues    Assessment/Plan   Problem List Items Addressed This Visit       Diabetes mellitus (CMS/[facility])     Patients diabetes is poorly controlled with most recent A1c of 7.9% (Goal < 7%).   Continue Lantus 12 units once daily, metformin 500mg once daily, Farxiga 10mg once daily.  Discussed starting GLP-1 agonist therapy (Trulicity per ins formulary). Reviewed MOA, administration, adverse effects, CVD/renal benefits. Goal is to reduce/dc insulin and assist with weight loss to get A1c to goal. Patient would like to research and re-visit appointment with her daughter present.     PharmD Follow-up: 2 weeks  PCP Follow-up: 12/14/23    Thank you,  Soco Davidson, PharmD    Continue all meds under the continuation of care with the referring provider and clinical pharmacy team.  Verbal consent to manage patient's drug therapy was obtained. They were informed they may decline to participate or withdraw from participation in pharmacy services at any time.

## 2023-10-06 ENCOUNTER — TELEPHONE (OUTPATIENT)
Dept: PRIMARY CARE | Facility: CLINIC | Age: 63
End: 2023-10-06
Payer: COMMERCIAL

## 2023-10-10 DIAGNOSIS — Z79.4 TYPE 2 DIABETES MELLITUS WITHOUT COMPLICATION, WITH LONG-TERM CURRENT USE OF INSULIN (MULTI): ICD-10-CM

## 2023-10-10 DIAGNOSIS — E11.9 TYPE 2 DIABETES MELLITUS WITHOUT COMPLICATION, WITH LONG-TERM CURRENT USE OF INSULIN (MULTI): ICD-10-CM

## 2023-10-10 RX ORDER — INSULIN GLARGINE 100 [IU]/ML
12 INJECTION, SOLUTION SUBCUTANEOUS NIGHTLY
Qty: 12 ML | Refills: 1 | Status: SHIPPED
Start: 2023-10-10 | End: 2024-02-09 | Stop reason: ALTCHOICE

## 2023-10-10 RX ORDER — PEN NEEDLE, DIABETIC 30 GX3/16"
NEEDLE, DISPOSABLE MISCELLANEOUS
Qty: 100 EACH | Refills: 1 | Status: SHIPPED
Start: 2023-10-10 | End: 2024-02-09 | Stop reason: ALTCHOICE

## 2023-10-12 ENCOUNTER — PHARMACY VISIT (OUTPATIENT)
Dept: PHARMACY | Facility: CLINIC | Age: 63
End: 2023-10-12
Payer: COMMERCIAL

## 2023-10-12 ENCOUNTER — TELEMEDICINE (OUTPATIENT)
Dept: PHARMACY | Facility: HOSPITAL | Age: 63
End: 2023-10-12
Payer: COMMERCIAL

## 2023-10-12 DIAGNOSIS — E11.9 TYPE 2 DIABETES MELLITUS WITHOUT COMPLICATION, WITH LONG-TERM CURRENT USE OF INSULIN (MULTI): ICD-10-CM

## 2023-10-12 DIAGNOSIS — Z79.4 TYPE 2 DIABETES MELLITUS WITHOUT COMPLICATION, WITH LONG-TERM CURRENT USE OF INSULIN (MULTI): ICD-10-CM

## 2023-10-12 PROCEDURE — RXMED WILLOW AMBULATORY MEDICATION CHARGE

## 2023-10-12 RX ORDER — DULAGLUTIDE 0.75 MG/.5ML
0.75 INJECTION, SOLUTION SUBCUTANEOUS
Qty: 2 ML | Refills: 1 | Status: SHIPPED | OUTPATIENT
Start: 2023-10-12 | End: 2023-11-09 | Stop reason: ALTCHOICE

## 2023-10-12 ASSESSMENT — ENCOUNTER SYMPTOMS: DIABETIC ASSOCIATED SYMPTOMS: 0

## 2023-10-12 NOTE — PROGRESS NOTES
Pharmacist Clinic: Diabetes Management  Kelsey Rai is a 63 y.o. female was referred to Clinical Pharmacy Team for diabetes management.   Referring Provider: Ora Ramirez,      Diabetes  She presents for her follow-up diabetic visit. She has type 2 diabetes mellitus. Her disease course has been stable. There are no hypoglycemic associated symptoms. There are no diabetic associated symptoms. Risk factors for coronary artery disease include diabetes mellitus, dyslipidemia, hypertension and obesity. Meal planning includes avoidance of concentrated sweets. An ACE inhibitor/angiotensin II receptor blocker is being taken.   Daughter Misael present for virtual visit. Discussed initiation of Trulicity therapy.     HISTORY OF PRESENT ILLNESS  Approximate Date of Diagnosis: Does not recall  Reports no known complications due to diabetes. Does have h/o CKD, renal mass s/p nephrectomy.     Pertinent PMH Review:  - PMH of Pancreatitis: no  - PMH/FH of Medullary Thyroid Cancer: no  - PMH of Retinopathy: no  - PMH of Urinary Tract Infections: no    LAB REVIEW   Lab Results   Component Value Date    CREATININE 1.24 (H) 09/01/2023      Lab Results   Component Value Date    HGBA1C 7.9 (A) 09/01/2023    HGBA1C 8.2 (A) 11/22/2022    HGBA1C 7.5 (A) 03/24/2022     Lab Results   Component Value Date    TRIG 120 09/01/2023    TRIG 146 11/22/2022    TRIG 111 08/20/2021     The 10-year ASCVD risk score (Zenia BARILLAS, et al., 2019) is: 11.1%    Values used to calculate the score:      Age: 63 years      Sex: Female      Is Non- : Yes      Diabetic: Yes      Tobacco smoker: No      Systolic Blood Pressure: 104 mmHg      Is BP treated: Yes      HDL Cholesterol: 36 mg/dL      Total Cholesterol: 151 mg/dL     DIABETES ASSESSMENT  CURRENT PHARMACOTHERAPY  - Farxiga 10mg once daily   - Metformin 500mg once daily  - Lantus 12 units once daily    Allergies: Banana, Latex, and Penicillins     SECONDARY PREVENTION  -  Statin? Yes  - ACE-I/ARB? Yes    HISTORICAL PHARMACOTHERAPY  - None    SMBG MEASUREMENTS  Patient is using: glucometer Testing twice daily in AM and before dinner usual range 134-154.  Denies readings <80 or >200.  Patient does not report symptoms of hypoglycemia.   Patient does not report symptoms of hyperglycemia.     AFFORDABILITY/ADHERENCE   - Denies issues    Assessment/Plan   Problem List Items Addressed This Visit       Diabetes mellitus (CMS/[facility])    Relevant Medications    dulaglutide (Trulicity) 0.75 mg/0.5 mL pen injector     Patients diabetes is poorly controlled with most recent A1c of 7.9% (Goal < 7%).   Continue Lantus 12 units once daily, metformin 500mg once daily, Farxiga 10mg once daily.  START Trulicity 0.75mg subcutaneous once weekly.   - Counseled patient on Trulicity MOA, expectations, side effects, duration of therapy,  administration, and monitoring parameters.  - Provided detailed dosing and administration counseling to ensure proper technique.   - Reviewed Trulicity titration schedule, starting with 0.75 mg once weekly to 1.5 mg, 3 mg, and if tolerated 4.5 mg.  - Counseled patient on the benefits of GLP-1ra, such as cardiovascular risk reduction, glycemic control, and weight loss potential.  - Reviewed storage requirements of Trulicity when not in use, and when to administer the medication if a dose is missed.  - Advised patient that they may experience improved satiety after meals and portion sizes of meals may be reduced as doses of Trulicity increase.   Goal is to reduce/dc insulin and assist with weight loss to get A1c to goal    PharmD Follow-up: 4 weeks  PCP Follow-up: 12/14/23    Thank you,  Soco Davidson, PharmD    Continue all meds under the continuation of care with the referring provider and clinical pharmacy team.  Verbal consent to manage patient's drug therapy was obtained. They were informed they may decline to participate or withdraw from participation in pharmacy services  at any time.

## 2023-10-24 ENCOUNTER — PHARMACY VISIT (OUTPATIENT)
Dept: PHARMACY | Facility: CLINIC | Age: 63
End: 2023-10-24

## 2023-10-24 ENCOUNTER — TELEPHONE (OUTPATIENT)
Dept: PHARMACY | Facility: HOSPITAL | Age: 63
End: 2023-10-24
Payer: COMMERCIAL

## 2023-10-24 PROCEDURE — RXMED WILLOW AMBULATORY MEDICATION CHARGE

## 2023-10-24 NOTE — TELEPHONE ENCOUNTER
Patient called stating she did not receive Trulicity from  Home delivery. Per technician [employer] tracking shows delivered on 10/17. Patient states she only received Lantus, which was not ordered for delivery. Patients daughter confirmed medication labels as well.  Home delivery to investigate fill and contact patient for resolution.

## 2023-11-03 ENCOUNTER — TELEPHONE (OUTPATIENT)
Dept: PHARMACY | Facility: HOSPITAL | Age: 63
End: 2023-11-03
Payer: COMMERCIAL

## 2023-11-03 NOTE — TELEPHONE ENCOUNTER
Patient called with medication questions. Received Trulicity and will be administering first dose. For Trulicity- reviewed administration instructions; advised ok to give once weekly dose at same time of nightly Lantus dose. For Lantus pens- keep in refrigerator until ready to use; stable for 28 days at room temp.    Clinical pharmacy follow up 11/9.

## 2023-11-09 ENCOUNTER — TELEMEDICINE (OUTPATIENT)
Dept: PHARMACY | Facility: HOSPITAL | Age: 63
End: 2023-11-09
Payer: COMMERCIAL

## 2023-11-09 ENCOUNTER — PHARMACY VISIT (OUTPATIENT)
Dept: PHARMACY | Facility: CLINIC | Age: 63
End: 2023-11-09
Payer: COMMERCIAL

## 2023-11-09 DIAGNOSIS — E11.9 TYPE 2 DIABETES MELLITUS WITHOUT COMPLICATION, WITH LONG-TERM CURRENT USE OF INSULIN (MULTI): ICD-10-CM

## 2023-11-09 DIAGNOSIS — Z79.4 TYPE 2 DIABETES MELLITUS WITHOUT COMPLICATION, WITH LONG-TERM CURRENT USE OF INSULIN (MULTI): ICD-10-CM

## 2023-11-09 PROCEDURE — RXMED WILLOW AMBULATORY MEDICATION CHARGE

## 2023-11-09 RX ORDER — DAPAGLIFLOZIN 10 MG/1
10 TABLET, FILM COATED ORAL DAILY
Qty: 90 TABLET | Refills: 1 | Status: SHIPPED | OUTPATIENT
Start: 2023-11-09 | End: 2024-03-22 | Stop reason: SDUPTHER

## 2023-11-09 RX ORDER — DULAGLUTIDE 1.5 MG/.5ML
1.5 INJECTION, SOLUTION SUBCUTANEOUS
Qty: 2 ML | Refills: 1 | Status: SHIPPED | OUTPATIENT
Start: 2023-11-09 | End: 2023-12-08 | Stop reason: ALTCHOICE

## 2023-11-09 ASSESSMENT — ENCOUNTER SYMPTOMS: DIABETIC ASSOCIATED SYMPTOMS: 0

## 2023-11-09 NOTE — PROGRESS NOTES
Pharmacist Clinic: Diabetes Management  Kelsey Rai is a 63 y.o. female was referred to Clinical Pharmacy Team for diabetes management.   Referring Provider: Ora Ramirez,      Diabetes  She presents for her follow-up diabetic visit. She has type 2 diabetes mellitus. Her disease course has been stable. There are no hypoglycemic associated symptoms. There are no diabetic associated symptoms. Risk factors for coronary artery disease include diabetes mellitus, dyslipidemia, hypertension and obesity. Meal planning includes avoidance of concentrated sweets. An ACE inhibitor/angiotensin II receptor blocker is being taken.   Since last visit, started Trulicity. Tolerating well. Denies GI s/e. No issues with administration. Reports fasting blood sugar range 100-130.    HISTORY OF PRESENT ILLNESS  Approximate Date of Diagnosis: Does not recall  Reports no known complications due to diabetes. Does have h/o CKD, renal mass s/p nephrectomy.     Pertinent PMH Review:  - PMH of Pancreatitis: no  - PMH/FH of Medullary Thyroid Cancer: no  - PMH of Retinopathy: no  - PMH of Urinary Tract Infections: no    LAB REVIEW   Lab Results   Component Value Date    CREATININE 1.24 (H) 09/01/2023      Lab Results   Component Value Date    HGBA1C 7.9 (A) 09/01/2023    HGBA1C 8.2 (A) 11/22/2022    HGBA1C 7.5 (A) 03/24/2022     Lab Results   Component Value Date    TRIG 120 09/01/2023    TRIG 146 11/22/2022    TRIG 111 08/20/2021     The 10-year ASCVD risk score (Zenia BARILLAS, et al., 2019) is: 11.1%    Values used to calculate the score:      Age: 63 years      Sex: Female      Is Non- : Yes      Diabetic: Yes      Tobacco smoker: No      Systolic Blood Pressure: 104 mmHg      Is BP treated: Yes      HDL Cholesterol: 36 mg/dL      Total Cholesterol: 151 mg/dL     DIABETES ASSESSMENT  CURRENT PHARMACOTHERAPY  - Farxiga 10mg once daily   - Metformin 500mg once daily  - Lantus 12 units once daily at bedtime  -  Trulicity 0.75mg subcutaneous once weekly (Tuesdays)    Allergies: Banana, Latex, and Penicillins     SECONDARY PREVENTION  - Statin? Yes  - ACE-I/ARB? Yes    HISTORICAL PHARMACOTHERAPY  - None    SMBG MEASUREMENTS  Patient is using: glucometer Testing 1-2 times daily.  Denies readings <80 or >200.  Patient does not report symptoms of hypoglycemia.   Patient does not report symptoms of hyperglycemia.     AFFORDABILITY/ADHERENCE   - Denies issues    Assessment/Plan   Problem List Items Addressed This Visit       Diabetes mellitus (CMS/[facility])     Patients diabetes is poorly controlled with most recent A1c of 7.9% (Goal < 7%).   Increase Trulicity to 1.5mg subcutaneous once weekly.   Discontinue metformin  Decrease Lantus to 8 units once daily  Goal is to reduce/dc insulin and assist with weight loss to get A1c to goal    PharmD Follow-up: 4 weeks  PCP Follow-up: 12/14/23    Thank you,  Soco Davidson, PharmD    Continue all meds under the continuation of care with the referring provider and clinical pharmacy team.  Verbal consent to manage patient's drug therapy was obtained. They were informed they may decline to participate or withdraw from participation in pharmacy services at any time.

## 2023-11-21 DIAGNOSIS — Z79.4 TYPE 2 DIABETES MELLITUS WITHOUT COMPLICATION, WITH LONG-TERM CURRENT USE OF INSULIN (MULTI): ICD-10-CM

## 2023-11-21 DIAGNOSIS — E11.9 TYPE 2 DIABETES MELLITUS WITHOUT COMPLICATION, WITH LONG-TERM CURRENT USE OF INSULIN (MULTI): ICD-10-CM

## 2023-11-21 RX ORDER — BLOOD-GLUCOSE METER
EACH MISCELLANEOUS
Qty: 200 STRIP | Refills: 1 | Status: SHIPPED | OUTPATIENT
Start: 2023-11-21 | End: 2024-03-22 | Stop reason: SDUPTHER

## 2023-11-27 ENCOUNTER — APPOINTMENT (OUTPATIENT)
Dept: SURGERY | Facility: CLINIC | Age: 63
End: 2023-11-27
Payer: COMMERCIAL

## 2023-12-04 ENCOUNTER — PHARMACY VISIT (OUTPATIENT)
Dept: PHARMACY | Facility: CLINIC | Age: 63
End: 2023-12-04
Payer: COMMERCIAL

## 2023-12-08 ENCOUNTER — TELEMEDICINE (OUTPATIENT)
Dept: PHARMACY | Facility: HOSPITAL | Age: 63
End: 2023-12-08
Payer: COMMERCIAL

## 2023-12-08 ENCOUNTER — OFFICE VISIT (OUTPATIENT)
Dept: SURGERY | Facility: CLINIC | Age: 63
End: 2023-12-08
Payer: COMMERCIAL

## 2023-12-08 VITALS — WEIGHT: 278 LBS | BODY MASS INDEX: 42.04 KG/M2

## 2023-12-08 DIAGNOSIS — L73.2 HIDRADENITIS SUPPURATIVA: Primary | ICD-10-CM

## 2023-12-08 DIAGNOSIS — E11.9 TYPE 2 DIABETES MELLITUS WITHOUT COMPLICATION, WITH LONG-TERM CURRENT USE OF INSULIN (MULTI): ICD-10-CM

## 2023-12-08 DIAGNOSIS — Z79.4 TYPE 2 DIABETES MELLITUS WITHOUT COMPLICATION, WITH LONG-TERM CURRENT USE OF INSULIN (MULTI): ICD-10-CM

## 2023-12-08 PROCEDURE — RXMED WILLOW AMBULATORY MEDICATION CHARGE

## 2023-12-08 PROCEDURE — 1159F MED LIST DOCD IN RCRD: CPT | Performed by: SURGERY

## 2023-12-08 PROCEDURE — 3051F HG A1C>EQUAL 7.0%<8.0%: CPT | Performed by: SURGERY

## 2023-12-08 PROCEDURE — 1036F TOBACCO NON-USER: CPT | Performed by: SURGERY

## 2023-12-08 PROCEDURE — 99213 OFFICE O/P EST LOW 20 MIN: CPT | Performed by: SURGERY

## 2023-12-08 PROCEDURE — 1125F AMNT PAIN NOTED PAIN PRSNT: CPT | Performed by: SURGERY

## 2023-12-08 PROCEDURE — 4010F ACE/ARB THERAPY RXD/TAKEN: CPT | Performed by: SURGERY

## 2023-12-08 ASSESSMENT — ENCOUNTER SYMPTOMS: DIABETIC ASSOCIATED SYMPTOMS: 0

## 2023-12-08 ASSESSMENT — PAIN SCALES - GENERAL: PAINLEVEL: 6

## 2023-12-08 NOTE — Clinical Note
Blood sugars look great. Increasing Trulicity to 3mg and stopping Lantus. PCP follow up 12/15. Thanks

## 2023-12-08 NOTE — PROGRESS NOTES
Subjective   Patient ID: Kelsey Rai is a 63 y.o. female who presents for Post-op (2 month follow up).  HPI      Chief Complaint  Pt is here for a follow-up for pain in right buttocks.         History of Present Illness  63-year-old female with multiple medical conditions. She is known to me having taken the surgery previously for wide excision of hidradenitis suppurativa involving the right and left groin regions. She now comes with chronic flare  of hidradenitis   involving the medial aspect the right buttock.  This is quite tender for her to sit.      Unfortunately she is caring for her daughter who is undergoing chemotherapy for cancer.     Review of Systems    Past Medical History:   Diagnosis Date    Pelvic and perineal pain 2015    Pelvic pain in female    Personal history of nicotine dependence 2018    History of tobacco abuse    Personal history of other diseases of the female genital tract 2016    History of postmenopausal bleeding    Residual hemorrhoidal skin tags 2014    External hemorrhoids        Past Surgical History:   Procedure Laterality Date     SECTION, CLASSIC  2015     Section    CHOLECYSTECTOMY  2015    Cholecystectomy    OTHER SURGICAL HISTORY  2019    Nephrectomy        Objective     Physical Exam    Well-nourished, well-developed. In no distress  Alert and oriented x 3  Lungs are clear to auscultation bilaterally.  Cardiac exam is regular rhythm and rate.  Abdomen is soft nontender nondistended.  Neurologic exam is without focal deficit.     The medial aspect of the right buttock multiple sinus tracts pores and induration consistent with active hidradenitis flare. No obvious deep space abscess at this time.           Assessment/Plan         Provider Impressions     Flare hidradenitis suppurativa right buttock. I have recommended surgery (wide excisional biopsy of the hidradenitis) however it is not a good time in that she is  caring for her daughter who has cancer.     Continue previously Prescribed   minocycline and topical clindamycin     Also recommend [facility]     Follow-up with me in 2 months

## 2023-12-08 NOTE — LETTER
2023     Ora Ramirez DO  [address]  [facility] [address]    Patient: Kelsey Rai   YOB: 1960   Date of Visit: 2023       Dear Dr. Ora Ramirez DO:    Thank you for referring Kelsey Rai to me for evaluation. Below are my notes for this consultation.  If you have questions, please do not hesitate to call me. I look forward to following your patient along with you.       Sincerely,     Nghia Lubin MD      CC: No Recipients  ______________________________________________________________________________________    Subjective  Patient ID: Kelsey Rai is a 63 y.o. female who presents for Post-op (2 month follow up).  HPI      Chief Complaint  Pt is here for a follow-up for pain in right buttocks.         History of Present Illness  63-year-old female with multiple medical conditions. She is known to me having taken the surgery previously for wide excision of hidradenitis suppurativa involving the right and left groin regions. She now comes with chronic flare  of hidradenitis   involving the medial aspect the right buttock.  This is quite tender for her to sit.      Unfortunately she is caring for her daughter who is undergoing chemotherapy for cancer.     Review of Systems    Past Medical History:   Diagnosis Date   • Pelvic and perineal pain 2015    Pelvic pain in female   • Personal history of nicotine dependence 2018    History of tobacco abuse   • Personal history of other diseases of the female genital tract 2016    History of postmenopausal bleeding   • Residual hemorrhoidal skin tags 2014    External hemorrhoids        Past Surgical History:   Procedure Laterality Date   •  SECTION, CLASSIC  2015     Section   • CHOLECYSTECTOMY  2015    Cholecystectomy   • OTHER SURGICAL HISTORY  2019    Nephrectomy        Objective    Physical Exam    Well-nourished, well-developed. In no distress  Alert  and oriented x 3  Lungs are clear to auscultation bilaterally.  Cardiac exam is regular rhythm and rate.  Abdomen is soft nontender nondistended.  Neurologic exam is without focal deficit.     The medial aspect of the right buttock multiple sinus tracts pores and induration consistent with active hidradenitis flare. No obvious deep space abscess at this time.           Assessment/Plan        Provider Impressions     Flare hidradenitis suppurativa right buttock. I have recommended surgery (wide excisional biopsy of the hidradenitis) however it is not a good time in that she is caring for her daughter who has cancer.     Continue previously Prescribed   minocycline and topical clindamycin     Also recommend [facility]     Follow-up with me in 2 months

## 2023-12-08 NOTE — PROGRESS NOTES
Pharmacist Clinic: Diabetes Management  Kelsey Rai is a 63 y.o. female was referred to Clinical Pharmacy Team for diabetes management.   Referring Provider: Ora Ramirez,      Diabetes  She presents for her follow-up diabetic visit. She has type 2 diabetes mellitus. Her disease course has been stable. There are no hypoglycemic associated symptoms. There are no diabetic associated symptoms. There are no diabetic complications. Risk factors for coronary artery disease include diabetes mellitus, dyslipidemia, hypertension and obesity. She is compliant with treatment all of the time. Meal planning includes avoidance of concentrated sweets. Her home blood glucose trend is decreasing steadily. An ACE inhibitor/angiotensin II receptor blocker is being taken.   Since last visit, increased Trulicity to 1.5mg. Reports one episode of constipation lasting 2 days, otherwise denies GI s/e. Appetite somewhat reduced. Unsure if she has lost weight.     HISTORY OF PRESENT ILLNESS  Approximate Date of Diagnosis: Does not recall  Reports no known complications due to diabetes. Does have h/o CKD, renal mass s/p nephrectomy.     Pertinent PMH Review:  - PMH of Pancreatitis: no  - PMH/FH of Medullary Thyroid Cancer: no  - PMH of Retinopathy: no  - PMH of Urinary Tract Infections: no    LAB REVIEW   Lab Results   Component Value Date    CREATININE 1.24 (H) 09/01/2023      Lab Results   Component Value Date    HGBA1C 7.9 (A) 09/01/2023    HGBA1C 8.2 (A) 11/22/2022    HGBA1C 7.5 (A) 03/24/2022     Lab Results   Component Value Date    TRIG 120 09/01/2023    TRIG 146 11/22/2022    TRIG 111 08/20/2021     The 10-year ASCVD risk score (Zenia BARILLAS, et al., 2019) is: 11.1%    Values used to calculate the score:      Age: 63 years      Sex: Female      Is Non- : Yes      Diabetic: Yes      Tobacco smoker: No      Systolic Blood Pressure: 104 mmHg      Is BP treated: Yes      HDL Cholesterol: 36 mg/dL      Total  Cholesterol: 151 mg/dL     DIABETES ASSESSMENT  CURRENT PHARMACOTHERAPY  - Farxiga 10mg once daily   - Lantus 8 units once daily at bedtime  - Trulicity 1.5mg subcutaneous once weekly (Tuesdays)    Allergies: Banana, Latex, and Penicillins     SECONDARY PREVENTION  - Statin? Yes  - ACE-I/ARB? Yes    HISTORICAL PHARMACOTHERAPY  - None    SMBG MEASUREMENTS  Patient is using: glucometer Testing 1-2 times daily. Usual range 100-120.  Denies readings <80 or >200.  Patient does not report symptoms of hypoglycemia.   Patient does not report symptoms of hyperglycemia.     AFFORDABILITY/ADHERENCE   - Denies issues    Assessment/Plan   Problem List Items Addressed This Visit       Diabetes mellitus (CMS/[facility])    Relevant Orders    Follow Up In Clinical Pharmacy     Patients diabetes is poorly controlled with most recent A1c of 7.9% (Goal < 7%).   Increase Trulicity to 3mg subcutaneous once weekly. (2 doses left of 1.5mg)  Discontinue Lantus.  Continue Farxiga 10mg once daily.    PharmD Follow-up: 5 weeks  PCP Follow-up: 12/15/23    Thank you,  Soco Davidson, PharmD    Continue all meds under the continuation of care with the referring provider and clinical pharmacy team.  Verbal consent to manage patient's drug therapy was obtained. They were informed they may decline to participate or withdraw from participation in pharmacy services at any time.

## 2023-12-11 ENCOUNTER — PHARMACY VISIT (OUTPATIENT)
Dept: PHARMACY | Facility: CLINIC | Age: 63
End: 2023-12-11
Payer: COMMERCIAL

## 2023-12-14 ENCOUNTER — APPOINTMENT (OUTPATIENT)
Dept: PRIMARY CARE | Facility: CLINIC | Age: 63
End: 2023-12-14
Payer: COMMERCIAL

## 2023-12-15 ENCOUNTER — OFFICE VISIT (OUTPATIENT)
Dept: PRIMARY CARE | Facility: CLINIC | Age: 63
End: 2023-12-15
Payer: COMMERCIAL

## 2023-12-15 VITALS — SYSTOLIC BLOOD PRESSURE: 113 MMHG | WEIGHT: 277 LBS | DIASTOLIC BLOOD PRESSURE: 77 MMHG | BODY MASS INDEX: 41.88 KG/M2

## 2023-12-15 DIAGNOSIS — E55.9 VITAMIN D DEFICIENCY: ICD-10-CM

## 2023-12-15 DIAGNOSIS — L73.2 HIDRADENITIS SUPPURATIVA: ICD-10-CM

## 2023-12-15 DIAGNOSIS — I10 HYPERTENSION, UNSPECIFIED TYPE: ICD-10-CM

## 2023-12-15 DIAGNOSIS — E78.5 HYPERLIPIDEMIA, UNSPECIFIED HYPERLIPIDEMIA TYPE: Primary | ICD-10-CM

## 2023-12-15 PROCEDURE — 3078F DIAST BP <80 MM HG: CPT | Performed by: INTERNAL MEDICINE

## 2023-12-15 PROCEDURE — 1036F TOBACCO NON-USER: CPT | Performed by: INTERNAL MEDICINE

## 2023-12-15 PROCEDURE — 1160F RVW MEDS BY RX/DR IN RCRD: CPT | Performed by: INTERNAL MEDICINE

## 2023-12-15 PROCEDURE — 1170F FXNL STATUS ASSESSED: CPT | Performed by: INTERNAL MEDICINE

## 2023-12-15 PROCEDURE — 4010F ACE/ARB THERAPY RXD/TAKEN: CPT | Performed by: INTERNAL MEDICINE

## 2023-12-15 PROCEDURE — 99214 OFFICE O/P EST MOD 30 MIN: CPT | Performed by: INTERNAL MEDICINE

## 2023-12-15 PROCEDURE — 3074F SYST BP LT 130 MM HG: CPT | Performed by: INTERNAL MEDICINE

## 2023-12-15 PROCEDURE — 1125F AMNT PAIN NOTED PAIN PRSNT: CPT | Performed by: INTERNAL MEDICINE

## 2023-12-15 PROCEDURE — 3051F HG A1C>EQUAL 7.0%<8.0%: CPT | Performed by: INTERNAL MEDICINE

## 2023-12-15 PROCEDURE — 1159F MED LIST DOCD IN RCRD: CPT | Performed by: INTERNAL MEDICINE

## 2023-12-15 RX ORDER — LISINOPRIL 10 MG/1
10 TABLET ORAL DAILY
Qty: 90 TABLET | Refills: 1 | Status: SHIPPED | OUTPATIENT
Start: 2023-12-15

## 2023-12-15 RX ORDER — TRAMADOL HYDROCHLORIDE 50 MG/1
50 TABLET ORAL EVERY 8 HOURS PRN
Qty: 30 TABLET | Refills: 0 | Status: SHIPPED | OUTPATIENT
Start: 2023-12-15 | End: 2023-12-25

## 2023-12-15 RX ORDER — ASPIRIN 81 MG/1
81 TABLET ORAL DAILY
Qty: 90 TABLET | Refills: 1 | Status: SHIPPED | OUTPATIENT
Start: 2023-12-15

## 2023-12-15 RX ORDER — ROSUVASTATIN CALCIUM 40 MG/1
40 TABLET, COATED ORAL DAILY
Qty: 30 TABLET | Refills: 1 | Status: SHIPPED | OUTPATIENT
Start: 2023-12-15 | End: 2024-02-19 | Stop reason: SDUPTHER

## 2023-12-15 RX ORDER — ACETAMINOPHEN 500 MG
2000 TABLET ORAL DAILY
Qty: 30 CAPSULE | Refills: 3 | Status: SHIPPED | OUTPATIENT
Start: 2023-12-15

## 2023-12-15 RX ORDER — HYDROCHLOROTHIAZIDE 25 MG/1
25 TABLET ORAL DAILY
Qty: 90 TABLET | Refills: 1 | Status: SHIPPED | OUTPATIENT
Start: 2023-12-15

## 2023-12-15 ASSESSMENT — ENCOUNTER SYMPTOMS
DEPRESSION: 0
OCCASIONAL FEELINGS OF UNSTEADINESS: 0
LOSS OF SENSATION IN FEET: 0

## 2023-12-15 ASSESSMENT — PATIENT HEALTH QUESTIONNAIRE - PHQ9
1. LITTLE INTEREST OR PLEASURE IN DOING THINGS: NOT AT ALL
SUM OF ALL RESPONSES TO PHQ9 QUESTIONS 1 AND 2: 0
2. FEELING DOWN, DEPRESSED OR HOPELESS: NOT AT ALL

## 2023-12-15 ASSESSMENT — ACTIVITIES OF DAILY LIVING (ADL)
TAKING_MEDICATION: INDEPENDENT
DRESSING: INDEPENDENT
MANAGING_FINANCES: INDEPENDENT
BATHING: INDEPENDENT
DOING_HOUSEWORK: INDEPENDENT
GROCERY_SHOPPING: INDEPENDENT

## 2023-12-15 NOTE — PROGRESS NOTES
Primary care office Note      Name: Kelsey Rai, Age: 63 y.o., Gender: female, MRN: [id number]   Pharmacy:   [facility] - [place], [place] - [address] [address]  [address] [address]  [place] [address]  Phone: [phone] Fax: [phone]     [facility]  [address] [place] [address], [address]  [place] [address]  Phone: [phone] Fax: [phone]     PCP: Ora Ramirez        Subjective:   Chief Complaint   Patient presents with    Medicare Annual Wellness Visit Subsequent     Complains of severe pain        Kelsey Rai is a 63 y.o. female who presented to the clinic today for wellness visit.     HPI:   Kelsey Rai is a 63 y.o. female with a medical history of T2DM, HTN, HLD, rt renal mass s/p nephrectomy, recurrent nephrolithiasis, and CKD stage 3a     She complained of severe pain in the lower back/buttock, waiting to see surgery.     She follows with Yocasta Trujillo.  The patient reports that she has been off the metformin and would be taken off the insulin too and was started on a medication which she now takes once a week.     The patient denies headaches, lightheadedness, changes in speech/vision, photophobia, dysphagia, diaphoresis, Chest pain, dyspnea, Abdominal pain, recent falls or trauma, and changes in bowel/urinary habits.     It is medically necessary for patient to have a donut pillow to off load pressure on her hydradenitis boils.  ROS:   12 points review of system is negative except as stated above in the HPI.    Medical History      PMH:    has a past medical history of Diabetes mellitus (CMS/[facility]), Hypertension, Pelvic and perineal pain (07/14/2015), Personal history of nicotine dependence (06/25/2018), Personal history of other diseases of the female genital tract (07/20/2016), and Residual hemorrhoidal skin tags (07/21/2014).   Allergies:   Allergies   Allergen Reactions    Banana Hives and Itching    Latex Unknown    Penicillins Itching     tolerated ceftriaxone 3/16/22. See allergy  "assessment from 3/18/2022      Surgical Hx:   Past Surgical History:   Procedure Laterality Date     SECTION, CLASSIC  2015     Section    CHOLECYSTECTOMY  2015    Cholecystectomy    OTHER SURGICAL HISTORY  2019    Nephrectomy      Social HX:   Social History     Tobacco Use    Smoking status: Former     Types: Cigarettes     Quit date:      Years since quittin.9    Smokeless tobacco: Never   Substance Use Topics    Alcohol use: Never    Drug use: Never        MEDS:   Current Outpatient Medications   Medication Instructions    aspirin 81 mg, oral, Daily    Autolet lancing device Use as instructed    benzoyl peroxide (Benzac AC) 10 % external wash apply to affected areas on body, let sit for 2-3 minutes and then rinse off. May bleach fabrics. Use daily when in the shower.    blood-glucose meter (OneTouch Verio Meter) misc 1 each, miscellaneous, 2 times daily    cholecalciferol (VITAMIN D-3) 50 mcg, oral, Daily    clindamycin (Cleocin T) 1 % lotion apply to affected areas under the breasts, groin area and buttocks twice daily    docusate sodium (COLACE) 100 mg, oral, 2 times daily    Farxiga 10 mg, oral, Daily    hydroCHLOROthiazide (HYDRODIURIL) 25 mg, oral, Daily    insulin glargine (LANTUS SOLOSTAR U-100 INSULIN) 12 Units, subcutaneous, Nightly    insulin syringe,safetyneedle 29G X 1/2\" 0.5 mL syringe Use to inject 1-4 times daily as directed.    lancets (OneTouch Delica Plus Lancet) 33 gauge misc TEST BLOOD SUGAR TWO TIMES A DAY    lancets misc Test blood sugar twice daily    lisinopril 10 mg, oral, Daily    OneTouch Verio test strips strip TEST TWO TIMES A DAY    pen needle, diabetic 31 gauge x 5/16\" needle Use to inject 1 time daily as directed with lantus solostar    rosuvastatin (CRESTOR) 40 mg, oral, Daily    simvastatin (ZOCOR) 40 mg, oral, Daily    traMADol (ULTRAM) 50 mg, oral, Every 8 hours PRN    Trulicity 3 mg, subcutaneous, Weekly        Objective Data "     Objective:   Visit Vitals  /77        Physical Examination:   GE; sitting comfortably in a chair, in NAD  HEENT; AT/NC, no conjunctival pallor, anicteric sclera  Neck; Supple neck, no LAD/JVD  Chest; CTAbl, no adventitious sounds  CVS; s1 and s2 only no MGR, RRR  Abd; soft, NT/ND, normoactive BS  Ext; no cyanosis/clubbing/edema, pulses intact  Neuro; Aox3  Psych; normal mood and behavior    Last Labs:   CBC:   WBC   Date Value Ref Range Status   11/22/2022 8.3 4.4 - 11.3 x10E9/L Final   07/27/2022 9.7 4.4 - 11.3 x10E9/L Final   03/20/2022 9.1 4.4 - 11.3 x10E9/L Final     Hemoglobin   Date Value Ref Range Status   11/22/2022 12.7 12.0 - 16.0 g/dL Final   07/27/2022 12.7 12.0 - 16.0 g/dL Final   03/20/2022 10.7 (L) 12.0 - 16.0 g/dL Final     MCV   Date Value Ref Range Status   11/22/2022 92 80 - 100 fL Final   07/27/2022 90 80 - 100 fL Final   03/20/2022 87 80 - 100 fL Final     Platelets   Date Value Ref Range Status   11/22/2022 273 150 - 450 x10E9/L Final   07/27/2022 284 150 - 450 x10E9/L Final   03/20/2022 254 150 - 450 x10E9/L Final      CMP:   Sodium   Date Value Ref Range Status   09/01/2023 140 136 - 145 mmol/L Final   11/22/2022 138 136 - 145 mmol/L Final   07/05/2022 135 (L) 136 - 145 mmol/L Final     Potassium   Date Value Ref Range Status   09/01/2023 4.1 3.5 - 5.3 mmol/L Final   11/22/2022 4.1 3.5 - 5.3 mmol/L Final   07/05/2022 4.1 3.5 - 5.3 mmol/L Final     Chloride   Date Value Ref Range Status   09/01/2023 102 98 - 107 mmol/L Final   11/22/2022 105 98 - 107 mmol/L Final   07/05/2022 103 98 - 107 mmol/L Final     Urea Nitrogen   Date Value Ref Range Status   09/01/2023 17 6 - 23 mg/dL Final   07/06/2023 25 (H) 6 - 23 mg/dL Final   11/22/2022 19 6 - 23 mg/dL Final     Creatinine   Date Value Ref Range Status   09/01/2023 1.24 (H) 0.50 - 1.05 mg/dL Final   07/06/2023 1.29 (H) 0.50 - 1.05 mg/dL Final   11/22/2022 1.13 (H) 0.50 - 1.05 mg/dL Final      A1c:   Hemoglobin A1C   Date Value Ref Range  Status   09/01/2023 7.9 (A) % Final     Comment:          Diagnosis of Diabetes-Adults   Non-Diabetic: < or = 5.6%   Increased risk for developing diabetes: 5.7-6.4%   Diagnostic of diabetes: > or = 6.5%  .       Monitoring of Diabetes                Age (y)     Therapeutic Goal (%)   Adults:          >18           <7.0   Pediatrics:    13-18           <7.5                   7-12           <8.0                   0- 6            7.5-8.5   American Diabetes Association. Diabetes Care 33(S1), Jan 2010.   11/22/2022 8.2 (A) % Final     Comment:          Diagnosis of Diabetes-Adults   Non-Diabetic: < or = 5.6%   Increased risk for developing diabetes: 5.7-6.4%   Diagnostic of diabetes: > or = 6.5%  .       Monitoring of Diabetes                Age (y)     Therapeutic Goal (%)   Adults:          >18           <7.0   Pediatrics:    13-18           <7.5                   7-12           <8.0                   0- 6            7.5-8.5   American Diabetes Association. Diabetes Care 33(S1), Jan 2010.     03/24/2022 7.5 (A) % Final     Comment:          Diagnosis of Diabetes-Adults   Non-Diabetic: < or = 5.6%   Increased risk for developing diabetes: 5.7-6.4%   Diagnostic of diabetes: > or = 6.5%  .       Monitoring of Diabetes                Age (y)     Therapeutic Goal (%)   Adults:          >18           <7.0   Pediatrics:    13-18           <7.5                   7-12           <8.0                   0- 6            7.5-8.5   American Diabetes Association. Diabetes Care 33(S1), Jan 2010.          Other labs;   Vit D:   Vitamin D, 25-Hydroxy   Date Value Ref Range Status   04/23/2022 55 ng/mL Final     Comment:     .  DEFICIENCY:         < 20   NG/ML  INSUFFICIENCY:      20-29  NG/ML  SUFFICIENCY:         NG/ML    THIS ASSAY ACCURATELY QUANTIFIES THE SUM OF  VITAMIN D3, 25-HYDROXY AND VIT D2,25-HYDROXY.     08/20/2021 40 ng/mL Final     Comment:     .  DEFICIENCY:         < 20   NG/ML  INSUFFICIENCY:      20-29   NG/ML  SUFFICIENCY:         NG/ML    THIS ASSAY ACCURATELY QUANTIFIES THE SUM OF  VITAMIN D3, 25-HYDROXY AND VIT D2,25-HYDROXY.     06/16/2020 48 ng/mL Final     Comment:     .  DEFICIENCY:         < 20   NG/ML  INSUFFICIENCY:      20-29  NG/ML  SUFFICIENCY:         NG/ML    THIS ASSAY ACCURATELY QUANTIFIES THE SUM OF  VITAMIN D3, 25-HYDROXY AND VIT D2,25-HYDROXY.        TSH:  TSH   Date Value Ref Range Status   04/23/2022 0.70 0.44 - 3.98 mIU/L Final     Comment:      TSH testing is performed using different testing    methodology at [facility] than at other    [facility]. Direct result comparisons should    only be made within the same method.     08/20/2021 0.79 0.44 - 3.98 mIU/L Final     Comment:      TSH testing is performed using different testing    methodology at [facility] than at other    [facility]. Direct result comparisons should    only be made within the same method.     03/26/2019 0.88 0.44 - 3.98 mIU/L Final     Comment:      TSH testing is performed using different testing    methodology at [facility] than at other    [facility] hospitals. Direct result comparisons should    only be made within the same method.          Assessment and Plan     The  pt is a 63 year old female with a pmh of T2DM, HTN, HLD, rt renal mass s/p nephrectomy, recurrent nephrolithiasis, and CKD stage 3a who came in for a routine followup and refill of medications.    Medicare Wellness Billing Compliance Satisfied    *This is a visual tool to show completion of required items on the day of the visit. Green checks will only appear on the date of visit.    Review all medications by prescribing practitioner or clinical pharmacist (such as prescriptions, OTCs, herbal therapies and supplements) documented in the medical record    Past Medical, Surgical, and Family History reviewed and updated in chart    Tobacco Use Reviewed    Alcohol Use Reviewed    Illicit Drug  Use Reviewed    PHQ2/9    Falls in Last Year Reviewed    Home Safety Risk Factors Reviewed    Cognitive Impairment Reviewed    Patient Self Assessment and Health Status    Current Diet Reviewed    Exercise Frequency    ADL - Hearing Impairment    ADL - Bathing    ADL - Dressing    ADL - Walks in Home    IADL - Managing Finances    IADL - Grocery Shopping    IADL - Taking Medications    IADL - Doing Housework         For DM  Last A1c 7.9  Continue lantus 12 at night, continue metformin  Continue diet and exercise  Ordered A1c,     For HLD  Simvastatin 40 recently switched to atorvastatin 80mg but patient reports that she could take the medication, so would try rosuvastatin 40 mg, will check lipid panel today, continue Lipitor 80 mg daily   Ascvd score 11.1%  Continue diet and exercise regimen for wt loss     For CKD stage 3a, renal mass s/p nephrectomy  sCr 1.29, GFR 46  Avoid nephrotoxic medications including NSAIDS  Encourage liberal intake of fluid, no salt     For HTN  Bp today   Continue hydrochlorothiazide 25, lisinopril 10, ASA 81       For severe pain  Would give tramadol 50mg   Doughnut pillow  Follows with surgery  Continue to monitor    HM  Had colonoscopy 2014, next time 2024  Mammography done 7/18 wnl  Dexa done this year, normal findings     Labs ordered today: lipid panel, A1c, cmp       Ora Ramirez, DO

## 2024-01-02 DIAGNOSIS — M54.50 RIGHT-SIDED LOW BACK PAIN WITHOUT SCIATICA, UNSPECIFIED CHRONICITY: ICD-10-CM

## 2024-01-02 DIAGNOSIS — M79.605 CHRONIC PAIN OF LEFT LOWER EXTREMITY: ICD-10-CM

## 2024-01-02 DIAGNOSIS — G89.29 CHRONIC PAIN OF LEFT LOWER EXTREMITY: ICD-10-CM

## 2024-01-02 PROCEDURE — RXMED WILLOW AMBULATORY MEDICATION CHARGE

## 2024-01-04 ENCOUNTER — PHARMACY VISIT (OUTPATIENT)
Dept: PHARMACY | Facility: CLINIC | Age: 64
End: 2024-01-04
Payer: COMMERCIAL

## 2024-01-12 ENCOUNTER — TELEMEDICINE (OUTPATIENT)
Dept: PHARMACY | Facility: HOSPITAL | Age: 64
End: 2024-01-12
Payer: COMMERCIAL

## 2024-01-12 DIAGNOSIS — Z79.4 TYPE 2 DIABETES MELLITUS WITHOUT COMPLICATION, WITH LONG-TERM CURRENT USE OF INSULIN (MULTI): ICD-10-CM

## 2024-01-12 DIAGNOSIS — E11.9 TYPE 2 DIABETES MELLITUS WITHOUT COMPLICATION, WITH LONG-TERM CURRENT USE OF INSULIN (MULTI): ICD-10-CM

## 2024-01-12 ASSESSMENT — ENCOUNTER SYMPTOMS: DIABETIC ASSOCIATED SYMPTOMS: 0

## 2024-01-12 NOTE — Clinical Note
Doing well with Trulicity. Has been able to stop Lantus. Will plan to increase to 4.5mg in one month. Thanks

## 2024-01-12 NOTE — PROGRESS NOTES
Pharmacist Clinic: Diabetes Management  Kelsey Rai is a 63 y.o. female was referred to Clinical Pharmacy Team for diabetes management.   Referring Provider: Ora Ramirez,      Diabetes  She presents for her follow-up diabetic visit. She has type 2 diabetes mellitus. Her disease course has been stable. There are no hypoglycemic associated symptoms. There are no diabetic associated symptoms. There are no diabetic complications. Risk factors for coronary artery disease include diabetes mellitus, dyslipidemia, hypertension and obesity. She is compliant with treatment all of the time. Meal planning includes avoidance of concentrated sweets. There is no change in her home blood glucose trend. An ACE inhibitor/angiotensin II receptor blocker is being taken.   Since last visit, Trulicity increased to 3mg and Lantus was stopped. Mild constipation is new. No abdominal pain, diarrhea or other GI s/e. No significant change in blood sugars. Notices reduced appetite/portion sizes. Feels maintains adequate nutrition and hydration.     HISTORY OF PRESENT ILLNESS  Approximate Date of Diagnosis: Does not recall  Reports no known complications due to diabetes. Does have h/o CKD, renal mass s/p nephrectomy.     Pertinent PMH Review:  - PMH of Pancreatitis: no  - PMH/FH of Medullary Thyroid Cancer: no  - PMH of Retinopathy: no  - PMH of Urinary Tract Infections: no    LAB REVIEW   Lab Results   Component Value Date    CREATININE 1.24 (H) 09/01/2023      Lab Results   Component Value Date    HGBA1C 7.9 (A) 09/01/2023    HGBA1C 8.2 (A) 11/22/2022    HGBA1C 7.5 (A) 03/24/2022     Lab Results   Component Value Date    TRIG 120 09/01/2023    TRIG 146 11/22/2022    TRIG 111 08/20/2021     The 10-year ASCVD risk score (Zenia BARILLAS, et al., 2019) is: 13.4%    Values used to calculate the score:      Age: 63 years      Sex: Female      Is Non- : Yes      Diabetic: Yes      Tobacco smoker: No      Systolic Blood  Pressure: 113 mmHg      Is BP treated: Yes      HDL Cholesterol: 36 mg/dL      Total Cholesterol: 151 mg/dL     DIABETES ASSESSMENT  CURRENT PHARMACOTHERAPY  - Farxiga 10mg once daily   - Trulicity 3mg subcutaneous once weekly (Tuesdays)    Allergies: Banana, Latex, and Penicillins     SECONDARY PREVENTION  - Statin? Yes  - ACE-I/ARB? Yes    HISTORICAL PHARMACOTHERAPY  - None    SMBG MEASUREMENTS  Patient is using: glucometer Testing 1-2 times daily. Usual range 100-120.  Denies readings <80 or >200.  Patient does not report symptoms of hypoglycemia.   Patient does not report symptoms of hyperglycemia.     AFFORDABILITY/ADHERENCE   - Denies issues    Assessment/Plan   Problem List Items Addressed This Visit       Diabetes mellitus (CMS/[facility])     Patients diabetes is poorly controlled with most recent A1c of 7.9% (Goal < 7%).   Continue Trulicity 3mg subcutaneous once weekly. Assess for dose increase in one month to optimize weight loss.  Continue Farxiga 10mg once daily.  Increase fiber intake and maintain adequate hydration to help with constipation. Recommend benefiber or metamucil OTC as a daily supplement.   Denies need for refills on meds or testing supplies.    PharmD Follow-up: 4 weeks  PCP Follow-up: 2/9/24    Thank you,  Soco Davidson, PharmD    Continue all meds under the continuation of care with the referring provider and clinical pharmacy team.  Verbal consent to manage patient's drug therapy was obtained. They were informed they may decline to participate or withdraw from participation in pharmacy services at any time.

## 2024-01-29 ENCOUNTER — PHARMACY VISIT (OUTPATIENT)
Dept: PHARMACY | Facility: CLINIC | Age: 64
End: 2024-01-29
Payer: COMMERCIAL

## 2024-01-29 PROCEDURE — RXMED WILLOW AMBULATORY MEDICATION CHARGE

## 2024-02-06 ENCOUNTER — PREP FOR PROCEDURE (OUTPATIENT)
Dept: SURGERY | Facility: HOSPITAL | Age: 64
End: 2024-02-06
Payer: COMMERCIAL

## 2024-02-06 DIAGNOSIS — L73.2 HIDRADENITIS SUPPURATIVA: Primary | ICD-10-CM

## 2024-02-06 DIAGNOSIS — D49.2 NEOPLASM OF UNSPECIFIED BEHAVIOR OF BONE, SOFT TISSUE, AND SKIN: ICD-10-CM

## 2024-02-06 DIAGNOSIS — L03.115 CELLULITIS OF RIGHT LOWER LIMB: ICD-10-CM

## 2024-02-07 ENCOUNTER — ANESTHESIA EVENT (OUTPATIENT)
Dept: OPERATING ROOM | Facility: HOSPITAL | Age: 64
End: 2024-02-07
Payer: COMMERCIAL

## 2024-02-08 ENCOUNTER — HOSPITAL ENCOUNTER (OUTPATIENT)
Facility: HOSPITAL | Age: 64
Setting detail: OUTPATIENT SURGERY
Discharge: HOME | End: 2024-02-08
Attending: SURGERY | Admitting: SURGERY
Payer: COMMERCIAL

## 2024-02-08 ENCOUNTER — ANESTHESIA (OUTPATIENT)
Dept: OPERATING ROOM | Facility: HOSPITAL | Age: 64
End: 2024-02-08
Payer: COMMERCIAL

## 2024-02-08 VITALS
DIASTOLIC BLOOD PRESSURE: 68 MMHG | OXYGEN SATURATION: 97 % | HEART RATE: 81 BPM | BODY MASS INDEX: 42.18 KG/M2 | TEMPERATURE: 97.7 F | RESPIRATION RATE: 15 BRPM | HEIGHT: 67 IN | WEIGHT: 268.74 LBS | SYSTOLIC BLOOD PRESSURE: 124 MMHG

## 2024-02-08 DIAGNOSIS — L73.2 HIDRADENITIS SUPPURATIVA: ICD-10-CM

## 2024-02-08 DIAGNOSIS — L73.2 HIDRADENITIS: Primary | ICD-10-CM

## 2024-02-08 DIAGNOSIS — L03.115 CELLULITIS OF RIGHT LOWER LIMB: ICD-10-CM

## 2024-02-08 DIAGNOSIS — D49.2 NEOPLASM OF UNSPECIFIED BEHAVIOR OF BONE, SOFT TISSUE, AND SKIN: ICD-10-CM

## 2024-02-08 PROBLEM — E11.9 DIABETES MELLITUS, TYPE 2 (MULTI): Status: ACTIVE | Noted: 2024-02-08

## 2024-02-08 LAB
GLUCOSE BLD MANUAL STRIP-MCNC: 113 MG/DL (ref 74–99)
GLUCOSE BLD MANUAL STRIP-MCNC: 141 MG/DL (ref 74–99)

## 2024-02-08 PROCEDURE — 2500000005 HC RX 250 GENERAL PHARMACY W/O HCPCS: Performed by: ANESTHESIOLOGY

## 2024-02-08 PROCEDURE — 88304 TISSUE EXAM BY PATHOLOGIST: CPT | Mod: TC,SUR,AHULAB | Performed by: SURGERY

## 2024-02-08 PROCEDURE — 3600000008 HC OR TIME - EACH INCREMENTAL 1 MINUTE - PROCEDURE LEVEL THREE: Performed by: SURGERY

## 2024-02-08 PROCEDURE — 2500000004 HC RX 250 GENERAL PHARMACY W/ HCPCS (ALT 636 FOR OP/ED): Performed by: SURGERY

## 2024-02-08 PROCEDURE — 2500000005 HC RX 250 GENERAL PHARMACY W/O HCPCS: Performed by: NURSE ANESTHETIST, CERTIFIED REGISTERED

## 2024-02-08 PROCEDURE — A4217 STERILE WATER/SALINE, 500 ML: HCPCS | Performed by: SURGERY

## 2024-02-08 PROCEDURE — 2500000001 HC RX 250 WO HCPCS SELF ADMINISTERED DRUGS (ALT 637 FOR MEDICARE OP): Performed by: ANESTHESIOLOGY

## 2024-02-08 PROCEDURE — 7100000001 HC RECOVERY ROOM TIME - INITIAL BASE CHARGE: Performed by: SURGERY

## 2024-02-08 PROCEDURE — 82947 ASSAY GLUCOSE BLOOD QUANT: CPT

## 2024-02-08 PROCEDURE — 3700000001 HC GENERAL ANESTHESIA TIME - INITIAL BASE CHARGE: Performed by: SURGERY

## 2024-02-08 PROCEDURE — 7100000010 HC PHASE TWO TIME - EACH INCREMENTAL 1 MINUTE: Performed by: SURGERY

## 2024-02-08 PROCEDURE — 2500000004 HC RX 250 GENERAL PHARMACY W/ HCPCS (ALT 636 FOR OP/ED): Performed by: NURSE ANESTHETIST, CERTIFIED REGISTERED

## 2024-02-08 PROCEDURE — 3600000003 HC OR TIME - INITIAL BASE CHARGE - PROCEDURE LEVEL THREE: Performed by: SURGERY

## 2024-02-08 PROCEDURE — A11471 PR EXC SWEAT GLAND LESN PERINEAL,COMPLX: Performed by: NURSE ANESTHETIST, CERTIFIED REGISTERED

## 2024-02-08 PROCEDURE — 11470 EXC SKN H/P/P/U SMPL/NTRM: CPT | Performed by: SURGERY

## 2024-02-08 PROCEDURE — 3700000002 HC GENERAL ANESTHESIA TIME - EACH INCREMENTAL 1 MINUTE: Performed by: SURGERY

## 2024-02-08 PROCEDURE — 2500000004 HC RX 250 GENERAL PHARMACY W/ HCPCS (ALT 636 FOR OP/ED): Performed by: ANESTHESIOLOGY

## 2024-02-08 PROCEDURE — 7100000009 HC PHASE TWO TIME - INITIAL BASE CHARGE: Performed by: SURGERY

## 2024-02-08 PROCEDURE — A11471 PR EXC SWEAT GLAND LESN PERINEAL,COMPLX: Performed by: ANESTHESIOLOGY

## 2024-02-08 PROCEDURE — 2720000007 HC OR 272 NO HCPCS: Performed by: SURGERY

## 2024-02-08 PROCEDURE — 88304 TISSUE EXAM BY PATHOLOGIST: CPT | Performed by: PATHOLOGY

## 2024-02-08 PROCEDURE — 7100000002 HC RECOVERY ROOM TIME - EACH INCREMENTAL 1 MINUTE: Performed by: SURGERY

## 2024-02-08 PROCEDURE — 2500000005 HC RX 250 GENERAL PHARMACY W/O HCPCS: Performed by: SURGERY

## 2024-02-08 RX ORDER — CLINDAMYCIN PHOSPHATE 150 MG/ML
INJECTION, SOLUTION INTRAVENOUS AS NEEDED
Status: DISCONTINUED | OUTPATIENT
Start: 2024-02-08 | End: 2024-02-08

## 2024-02-08 RX ORDER — SODIUM CHLORIDE, SODIUM LACTATE, POTASSIUM CHLORIDE, CALCIUM CHLORIDE 600; 310; 30; 20 MG/100ML; MG/100ML; MG/100ML; MG/100ML
100 INJECTION, SOLUTION INTRAVENOUS CONTINUOUS
Status: DISCONTINUED | OUTPATIENT
Start: 2024-02-08 | End: 2024-02-08 | Stop reason: HOSPADM

## 2024-02-08 RX ORDER — MIDAZOLAM HYDROCHLORIDE 1 MG/ML
INJECTION INTRAMUSCULAR; INTRAVENOUS AS NEEDED
Status: DISCONTINUED | OUTPATIENT
Start: 2024-02-08 | End: 2024-02-08

## 2024-02-08 RX ORDER — KETOROLAC TROMETHAMINE 30 MG/ML
INJECTION, SOLUTION INTRAMUSCULAR; INTRAVENOUS AS NEEDED
Status: DISCONTINUED | OUTPATIENT
Start: 2024-02-08 | End: 2024-02-08

## 2024-02-08 RX ORDER — ONDANSETRON HYDROCHLORIDE 2 MG/ML
4 INJECTION, SOLUTION INTRAVENOUS ONCE AS NEEDED
Status: COMPLETED | OUTPATIENT
Start: 2024-02-08 | End: 2024-02-08

## 2024-02-08 RX ORDER — ONDANSETRON HYDROCHLORIDE 2 MG/ML
INJECTION, SOLUTION INTRAVENOUS AS NEEDED
Status: DISCONTINUED | OUTPATIENT
Start: 2024-02-08 | End: 2024-02-08

## 2024-02-08 RX ORDER — PROPOFOL 10 MG/ML
INJECTION, EMULSION INTRAVENOUS AS NEEDED
Status: DISCONTINUED | OUTPATIENT
Start: 2024-02-08 | End: 2024-02-08

## 2024-02-08 RX ORDER — OXYCODONE HYDROCHLORIDE 5 MG/1
5 TABLET ORAL EVERY 6 HOURS PRN
Qty: 15 TABLET | Refills: 0 | Status: SHIPPED | OUTPATIENT
Start: 2024-02-08

## 2024-02-08 RX ORDER — SODIUM CHLORIDE, SODIUM LACTATE, POTASSIUM CHLORIDE, CALCIUM CHLORIDE 600; 310; 30; 20 MG/100ML; MG/100ML; MG/100ML; MG/100ML
INJECTION, SOLUTION INTRAVENOUS CONTINUOUS PRN
Status: DISCONTINUED | OUTPATIENT
Start: 2024-02-08 | End: 2024-02-08

## 2024-02-08 RX ORDER — SODIUM CHLORIDE 0.9 G/100ML
IRRIGANT IRRIGATION AS NEEDED
Status: DISCONTINUED | OUTPATIENT
Start: 2024-02-08 | End: 2024-02-08 | Stop reason: HOSPADM

## 2024-02-08 RX ORDER — ACETAMINOPHEN 325 MG/1
650 TABLET ORAL EVERY 4 HOURS PRN
Status: DISCONTINUED | OUTPATIENT
Start: 2024-02-08 | End: 2024-02-08 | Stop reason: HOSPADM

## 2024-02-08 RX ORDER — OXYCODONE HYDROCHLORIDE 5 MG/1
5 TABLET ORAL EVERY 4 HOURS PRN
Status: DISCONTINUED | OUTPATIENT
Start: 2024-02-08 | End: 2024-02-08 | Stop reason: HOSPADM

## 2024-02-08 RX ORDER — LIDOCAINE HYDROCHLORIDE 10 MG/ML
INJECTION INFILTRATION; PERINEURAL AS NEEDED
Status: DISCONTINUED | OUTPATIENT
Start: 2024-02-08 | End: 2024-02-08

## 2024-02-08 RX ORDER — ROCURONIUM BROMIDE 10 MG/ML
INJECTION, SOLUTION INTRAVENOUS AS NEEDED
Status: DISCONTINUED | OUTPATIENT
Start: 2024-02-08 | End: 2024-02-08

## 2024-02-08 RX ORDER — FENTANYL CITRATE 50 UG/ML
INJECTION, SOLUTION INTRAMUSCULAR; INTRAVENOUS AS NEEDED
Status: DISCONTINUED | OUTPATIENT
Start: 2024-02-08 | End: 2024-02-08

## 2024-02-08 RX ORDER — PHENYLEPHRINE HCL IN 0.9% NACL 1 MG/10 ML
SYRINGE (ML) INTRAVENOUS AS NEEDED
Status: DISCONTINUED | OUTPATIENT
Start: 2024-02-08 | End: 2024-02-08

## 2024-02-08 RX ORDER — DEXAMETHASONE SODIUM PHOSPHATE 4 MG/ML
INJECTION, SOLUTION INTRA-ARTICULAR; INTRALESIONAL; INTRAMUSCULAR; INTRAVENOUS; SOFT TISSUE AS NEEDED
Status: DISCONTINUED | OUTPATIENT
Start: 2024-02-08 | End: 2024-02-08

## 2024-02-08 RX ADMIN — PROPOFOL 200 MG: 10 INJECTION, EMULSION INTRAVENOUS at 09:34

## 2024-02-08 RX ADMIN — Medication 6 L/MIN: at 10:43

## 2024-02-08 RX ADMIN — ONDANSETRON 4 MG: 2 INJECTION INTRAMUSCULAR; INTRAVENOUS at 09:50

## 2024-02-08 RX ADMIN — LIDOCAINE HYDROCHLORIDE 100 ML: 10 INJECTION, SOLUTION INFILTRATION; PERINEURAL at 09:34

## 2024-02-08 RX ADMIN — OXYCODONE HYDROCHLORIDE 5 MG: 5 TABLET ORAL at 11:20

## 2024-02-08 RX ADMIN — Medication 200 MCG: at 10:10

## 2024-02-08 RX ADMIN — FENTANYL CITRATE 50 MCG: 50 INJECTION, SOLUTION INTRAMUSCULAR; INTRAVENOUS at 09:45

## 2024-02-08 RX ADMIN — Medication 100 MCG: at 10:02

## 2024-02-08 RX ADMIN — FENTANYL CITRATE 50 MCG: 50 INJECTION, SOLUTION INTRAMUSCULAR; INTRAVENOUS at 09:34

## 2024-02-08 RX ADMIN — ONDANSETRON 4 MG: 2 INJECTION INTRAMUSCULAR; INTRAVENOUS at 11:14

## 2024-02-08 RX ADMIN — SUGAMMADEX 200 MG: 100 INJECTION, SOLUTION INTRAVENOUS at 10:34

## 2024-02-08 RX ADMIN — CLINDAMYCIN PHOSPHATE 900 MG: 150 INJECTION, SOLUTION INTRAMUSCULAR; INTRAVENOUS at 09:31

## 2024-02-08 RX ADMIN — MIDAZOLAM HYDROCHLORIDE 2 MG: 1 INJECTION, SOLUTION INTRAMUSCULAR; INTRAVENOUS at 09:24

## 2024-02-08 RX ADMIN — DEXAMETHASONE SODIUM PHOSPHATE 4 MG: 4 INJECTION, SOLUTION INTRAMUSCULAR; INTRAVENOUS at 09:50

## 2024-02-08 RX ADMIN — Medication 200 MCG: at 10:23

## 2024-02-08 RX ADMIN — SODIUM CHLORIDE, POTASSIUM CHLORIDE, SODIUM LACTATE AND CALCIUM CHLORIDE 100 ML/HR: 600; 310; 30; 20 INJECTION, SOLUTION INTRAVENOUS at 10:43

## 2024-02-08 RX ADMIN — HYDROMORPHONE HYDROCHLORIDE 0.5 MG: 1 INJECTION, SOLUTION INTRAMUSCULAR; INTRAVENOUS; SUBCUTANEOUS at 10:59

## 2024-02-08 RX ADMIN — Medication 100 MCG: at 10:13

## 2024-02-08 RX ADMIN — SODIUM CHLORIDE, POTASSIUM CHLORIDE, SODIUM LACTATE AND CALCIUM CHLORIDE: 600; 310; 30; 20 INJECTION, SOLUTION INTRAVENOUS at 09:15

## 2024-02-08 RX ADMIN — KETOROLAC TROMETHAMINE 30 MG: 30 INJECTION, SOLUTION INTRAMUSCULAR; INTRAVENOUS at 10:07

## 2024-02-08 RX ADMIN — ROCURONIUM BROMIDE 50 MG: 10 INJECTION, SOLUTION INTRAVENOUS at 09:34

## 2024-02-08 RX ADMIN — HYDROMORPHONE HYDROCHLORIDE 0.5 MG: 1 INJECTION, SOLUTION INTRAMUSCULAR; INTRAVENOUS; SUBCUTANEOUS at 11:14

## 2024-02-08 SDOH — HEALTH STABILITY: MENTAL HEALTH: CURRENT SMOKER: 0

## 2024-02-08 ASSESSMENT — PAIN DESCRIPTION - DESCRIPTORS
DESCRIPTORS: SORE

## 2024-02-08 ASSESSMENT — PAIN SCALES - GENERAL
PAINLEVEL_OUTOF10: 4
PAINLEVEL_OUTOF10: 7
PAINLEVEL_OUTOF10: 4
PAINLEVEL_OUTOF10: 7
PAINLEVEL_OUTOF10: 7
PAIN_LEVEL: 0
PAINLEVEL_OUTOF10: 6
PAINLEVEL_OUTOF10: 7
PAINLEVEL_OUTOF10: 5 - MODERATE PAIN

## 2024-02-08 ASSESSMENT — PAIN - FUNCTIONAL ASSESSMENT
PAIN_FUNCTIONAL_ASSESSMENT: 0-10

## 2024-02-08 ASSESSMENT — COLUMBIA-SUICIDE SEVERITY RATING SCALE - C-SSRS
1. IN THE PAST MONTH, HAVE YOU WISHED YOU WERE DEAD OR WISHED YOU COULD GO TO SLEEP AND NOT WAKE UP?: NO
6. HAVE YOU EVER DONE ANYTHING, STARTED TO DO ANYTHING, OR PREPARED TO DO ANYTHING TO END YOUR LIFE?: NO
2. HAVE YOU ACTUALLY HAD ANY THOUGHTS OF KILLING YOURSELF?: NO

## 2024-02-08 NOTE — ANESTHESIA PREPROCEDURE EVALUATION
Patient: Kelsey Rai    Procedure Information       Anesthesia Start Date/Time: 24 0844    Procedure: Right Groin Lesion Excision (Right: Groin)    Location: [facility] A OR 04 / [facility] A OR    Surgeons: Nghia Lubin MD            Relevant Problems   Anesthesia (within normal limits)      Cardiovascular   (+) Hyperlipidemia   (+) Hypertension      Endocrine   (+) Diabetes mellitus, type 2 (CMS/HCC)   (+) Morbid obesity (CMS/HCC)      /Renal   (+) CKD (chronic kidney disease)   (+) Calculus of renal pelvis   (+) Nephrolithiasis   (+) Renal cell carcinoma, right (CMS/HCC)   (+) Uric acid renal calculus      Pulmonary (within normal limits)      Hematology (within normal limits)      Infectious Disease   (+) Hidradenitis suppurativa     Vitals:    24 0816   BP: 117/67   Pulse: 91   Resp: 16   Temp: 36 °C (96.8 °F)   SpO2: 100%       Past Surgical History:   Procedure Laterality Date     SECTION, CLASSIC  2015     Section    CHOLECYSTECTOMY  2015    Cholecystectomy    OTHER SURGICAL HISTORY  2019    Nephrectomy     Past Medical History:   Diagnosis Date    Diabetes mellitus (CMS/HCC)     Hypertension     Pelvic and perineal pain 2015    Pelvic pain in female    Personal history of nicotine dependence 2018    History of tobacco abuse    Personal history of other diseases of the female genital tract 2016    History of postmenopausal bleeding    Residual hemorrhoidal skin tags 2014    External hemorrhoids     No current facility-administered medications for this encounter.  Prior to Admission medications    Medication Sig Start Date End Date Taking? Authorizing Provider   aspirin 81 mg EC tablet Take 1 tablet (81 mg) by mouth once daily. 12/15/23  Yes Manjeet Donis MD   Autolet lancing device Use as instructed 23 Yes Ora Ramirez,    blood-glucose meter (OneTouch Verio Meter) misc 1 each 2 times a day. 23  Yes Mirlande  "DO Nadege   cholecalciferol (Vitamin D-3) 50 mcg (2,000 unit) capsule Take 1 capsule (50 mcg) by mouth early in the morning.. 12/15/23  Yes Manjeet Donis MD   clindamycin (Cleocin T) 1 % lotion apply to affected areas under the breasts, groin area and buttocks twice daily 5/4/22  Yes Historical Provider, MD   dapagliflozin propanediol (Farxiga) 10 mg Take 1 tablet (10 mg) by mouth once daily. 11/9/23  Yes Ora Ramirez DO   docusate sodium (Colace) 100 mg capsule Take 1 capsule (100 mg) by mouth 2 times a day. 5/16/23  Yes Mirlande Light DO   dulaglutide 3 mg/0.5 mL pen injector Inject 3 mg under the skin 1 (one) time per week. 12/8/23  Yes Ora Ramirez DO   hydroCHLOROthiazide (HYDRODiuril) 25 mg tablet Take 1 tablet (25 mg) by mouth once daily. 12/15/23  Yes Manjeet Donis MD   lisinopril 10 mg tablet Take 1 tablet (10 mg) by mouth once daily. 12/15/23  Yes Manjeet Donis MD   rosuvastatin (Crestor) 40 mg tablet Take 1 tablet (40 mg) by mouth once daily. 12/15/23 6/12/24 Yes Manjeet Donis MD   benzoyl peroxide (Benzac AC) 10 % external wash apply to affected areas on body, let sit for 2-3 minutes and then rinse off. May bleach fabrics. Use daily when in the shower. 5/4/22   Historical Provider, MD   insulin glargine (Lantus Solostar U-100 Insulin) 100 unit/mL (3 mL) pen Inject 12 Units under the skin once daily at bedtime.  Patient not taking: Reported on 2/8/2024 10/10/23 4/27/24  Ora Ramirez DO   insulin syringe,safetyneedle 29G X 1/2\" 0.5 mL syringe Use to inject 1-4 times daily as directed.  Patient not taking: Reported on 2/8/2024 8/17/23 8/16/24  Narendra Dudley MD   lancets (OneTouch Delica Plus Lancet) 33 gauge misc TEST BLOOD SUGAR TWO TIMES A DAY  Patient not taking: Reported on 2/8/2024 9/5/23   DO murray Varner misc Test blood sugar twice daily  Patient not taking: Reported on 2/8/2024 8/17/23   Narendra Dudley MD   OneTouch Verio test strips strip TEST TWO TIMES A " "DAY  Patient not taking: Reported on 23   Ora Ramirez DO   pen needle, diabetic 31 gauge x \" needle Use to inject 1 time daily as directed with lantus solostar  Patient not taking: Reported on 2024 10/10/23 10/9/24  Ora Ramirez DO   simvastatin (Zocor) 40 mg tablet Take 1 tablet (40 mg) by mouth once daily.  Patient not taking: Reported on 2024   Ora Ramirez DO     Allergies   Allergen Reactions    Banana Hives and Itching    Latex Unknown    Penicillins Itching     tolerated ceftriaxone 3/16/22. See allergy assessment from 3/18/2022     Social History     Tobacco Use    Smoking status: Former     Types: Cigarettes     Quit date:      Years since quittin.1    Smokeless tobacco: Never   Substance Use Topics    Alcohol use: Never         Chemistry    Lab Results   Component Value Date/Time     2023 1056    K 4.1 2023 1056     2023 1056    CO2 28 2023 1056    BUN 17 2023 1056    CREATININE 1.24 (H) 2023 1056    Lab Results   Component Value Date/Time    CALCIUM 9.6 2023 1056    ALKPHOS 55 2023 1056    AST 11 2023 1056    ALT 9 2023 1056    BILITOT 0.5 2023 1056          Lab Results   Component Value Date/Time    WBC 8.3 2022 1200    HGB 12.7 2022 1200    HCT 37.9 2022 1200     2022 1200     Lab Results   Component Value Date/Time    PROTIME 10.8 2019 1326    INR 1.0 2019 1326       Clinical information reviewed:   Tobacco  Allergies  Meds   Med Hx  Surg Hx  OB Status  Fam Hx  Soc   Hx        NPO Detail:  NPO/Void Status  Carbohydrate Drink Given Prior to Surgery? : N  Date of Last Liquid: 24  Time of Last Liquid: 0600  Date of Last Solid: 24  Time of Last Solid: 0  Last Intake Type: Clear fluids  Time of Last Void: 0600         Physical Exam    Airway  Mallampati: II  TM distance: >3 FB  Neck ROM: full     Cardiovascular "   Rhythm: regular     Dental     Comments: Some missing teeth   Pulmonary   Breath sounds clear to auscultation     Abdominal            Anesthesia Plan    History of general anesthesia?: unknown/emergency  History of complications of general anesthesia?: no    ASA 3     general     The patient is not a current smoker.    intravenous induction   Postoperative administration of opioids is intended.  Trial extubation is planned.  Anesthetic plan and risks discussed with patient.  Use of blood products discussed with patient who consented to blood products.    Plan discussed with CRNA.

## 2024-02-08 NOTE — ANESTHESIA POSTPROCEDURE EVALUATION
Patient: Kelsey Rai    Procedure Summary       Date: 02/08/24 Room / Location: [facility] A OR 04 / Virtual [facility] A OR    Anesthesia Start: 0915 Anesthesia Stop: 1049    Procedure: Wide Excision of Right Buttock Hidradenitis (Right: Buttocks) Diagnosis:       Hidradenitis suppurativa      Cellulitis of right lower limb      Neoplasm of unspecified behavior of bone, soft tissue, and skin      (Hidradenitis suppurativa [L73.2])      (Cellulitis of right lower limb [L03.115])      (Neoplasm of unspecified behavior of bone, soft tissue, and skin [D49.2])    Surgeons: Nghia Lubin MD Responsible Provider: Fly Munson DO    Anesthesia Type: general ASA Status: 3            Anesthesia Type: general    Vitals Value Taken Time   /59 02/08/24 1132   Temp 36.5 °C (97.7 °F) 02/08/24 1043   Pulse 77 02/08/24 1143   Resp 16 02/08/24 1130   SpO2 97 % 02/08/24 1143   Vitals shown include unvalidated device data.    Anesthesia Post Evaluation    Patient location during evaluation: PACU  Patient participation: complete - patient participated  Level of consciousness: awake and alert  Pain management: satisfactory to patient  Airway patency: patent  Cardiovascular status: acceptable and blood pressure returned to baseline  Respiratory status: acceptable  Hydration status: acceptable  Postoperative Nausea and Vomiting: none        No notable events documented.

## 2024-02-08 NOTE — PERIOPERATIVE NURSING NOTE
Patient to PACU [place] [facility] post right buttock hydradenitis incision, awake, A&Ox3, on RA, wound to right buttock has a penrose drain that's clean, dry and intact , IV #20 in left hand is capped, VSS.    1150: Family at bedside.    1152: Discharge instructions reviewed with patient and her family. Both acknowledged DC instructions and POC after DC.    1200: Patient getting dressed and ready to go home.    1210: IV removed, catheter lumen intact, pressure applied. Patient placed in transport.    1217: Patient left PACU [place] [facility] for main lobby via WC via transport

## 2024-02-08 NOTE — PERIOPERATIVE NURSING NOTE
1043- PHASE I - Patient to PACU bay at this time in stable condition. Bedside monitors applied to patient upon arrival to PACU. Report received from OR Team at bedside.     1050- Nasal trumpet removed at this time and patient on RA     1059- Medicated patient at this time per orders for pain rating of 7/10. Verified allergies prior to admin     1103- Patient's daughter updated by Dr. Lubin at this time and text message sent with updates as well    1110- Patient tolerating bites of jello at this time and sips of juice.     1114- Medicated patient at this time per orders for pain rating of 7/10. Verified allergies prior to admin     1120- Medicated patient at this time per orders for pain rating of 5/10. Verified allergies prior to admin     1144- Criteria met for patient to discharge from Phase I at this time    1145- PHASE II - report to PACU RN

## 2024-02-08 NOTE — H&P
"History Of Present Illness  Kelsey Rai is a 63 y.o. female with multiple medical comorbidities presenting for excisional biopsy hidradenitis suppurativa on right buttocks.  Patient has longstanding history of hidradenitis suppurativa in the groin area as now presenting with lesion on buttocks.    Past Medical History  Past Medical History:   Diagnosis Date    Diabetes mellitus (CMS/HCC)     Hypertension     Pelvic and perineal pain 2015    Pelvic pain in female    Personal history of nicotine dependence 2018    History of tobacco abuse    Personal history of other diseases of the female genital tract 2016    History of postmenopausal bleeding    Residual hemorrhoidal skin tags 2014    External hemorrhoids     Surgical History  Past Surgical History:   Procedure Laterality Date     SECTION, CLASSIC  2015     Section    CHOLECYSTECTOMY  2015    Cholecystectomy    OTHER SURGICAL HISTORY  2019    Nephrectomy        Social History  She reports that she quit smoking about 6 years ago. Her smoking use included cigarettes. She has never used smokeless tobacco. She reports that she does not drink alcohol and does not use drugs.    Family History  No family history on file.     Allergies  Banana, Latex, and Penicillins    10 point review of systems was performed.    Objective   Physical Exam:  General: Well developed patient, alert and oriented, NAD  Neuro: A&Ox3, grossly normal  CV: RRR, nrl S1, S2, no murmur, rubs, or clicks  Resp: Good bilateral air entry. No crackles,  wheezing, or rhonchi  Abdomen: Non distended, + BS, soft, non-tender  Multiple sinus tract/pores on the medial aspect of the right buttocks.     Last Recorded Vitals  Blood pressure 117/67, pulse 91, temperature 36 °C (96.8 °F), temperature source Temporal, resp. rate 16, height 1.702 m (5' 7\"), weight 122 kg (268 lb 11.9 oz), SpO2 100 %.       Assessment/Plan   Active Problems:    Hidradenitis " suppurativa    Cellulitis of right lower limb    Neoplasm of unspecified behavior of bone, soft tissue, and skin  Plan to take to the operating room for excisional biopsy of hidradenitis suppurativa on right buttocks.  Risks, benefits, alternatives were discussed and the patient has agreed to the operation.

## 2024-02-08 NOTE — OP NOTE
Right Groin Lesion Excision (R) Operative Note     Date: 2024  OR Location: [facility] A OR    Name: Kelsey Rai : 1960, Age: 63 y.o., MRN: [id number], Sex: female    Diagnosis  Pre-op Diagnosis     * Hidradenitis suppurativa right buttock Post-op Diagnosis     * Hidradenitis suppurativa right buttock     Procedures  Wide excisional biopsy of right buttock hidradenitis       Surgeons      * Nghia Lubin - Primary    Resident/Fellow/Other Assistant:  Surgeon(s) and Role:     * Cristian Kimble MD - Resident - Assisting    Procedure Summary  Anesthesia: General  ASA: III  Anesthesia Staff: Anesthesiologist: Fly Munson DO  CRNA: ANDRZEJ Iniguez-CRNA  Estimated Blood Loss: 5 mL  Intra-op Medications: Administrations occurring from 1000 to 1100 on 24:  * No intraprocedure medications in log *           Anesthesia Record               Intraprocedure I/O Totals          Intake    LR infusion 800.00 mL    Total Intake 800 mL          Specimen:   ID Type Source Tests Collected by Time   1 : right buttocks hidradentitis Tissue ABSCESS SURGICAL PATHOLOGY EXAM Nghia Lubin MD 2024 1012        Staff:   Circulator: Bin Lau RN  Relief Scrub: Juancho William RN  Scrub Person: Lorraine Beltre          Indications: Kelsey Rai is an 63 y.o. female who is having surgery for Hidradenitis suppurativa [L73.2]  Cellulitis of right lower limb [L03.115]  Neoplasm of unspecified behavior of bone, soft tissue, and skin [D49.2].     The patient was seen in the preoperative area. The risks, benefits, complications, treatment options, non-operative alternatives, expected recovery and outcomes were discussed with the patient. The possibilities of reaction to medication, pulmonary aspiration, injury to surrounding structures, bleeding, recurrent infection, the need for additional procedures, failure to diagnose a condition, and creating a complication requiring transfusion or operation were  discussed with the patient. The patient concurred with the proposed plan, giving informed consent.  The site of surgery was properly noted/marked if necessary per policy. The patient has been actively warmed in preoperative area. Preoperative antibiotics have been ordered and given within 1 hours of incision. Venous thrombosis prophylaxis have been ordered including bilateral sequential compression devices     Procedure Details:   Patient is a 63-year-old female with longstanding issues related to hidradenitis suppurativa.  She has had excisional biopsy of groin lesions in the past.  She comes now for wide excision of refractory hidradenitis suppurativa involving the medial aspect of the right buttock.  Risk, benefits and alternatives were discussed preoperatively and she agrees to the operation    DESCRIPTION OF PROCEDURE:     The patient was taken to the operating room.  Time-out was established.  General anesthesia was induced.  She was given antibiotics.  She was placed in the jackknife prone position on the operating table.  The right buttock was prepped and draped in a sterile fashion.  We torito out our proposed elliptical incision as to incorporate the markedly indurated and diseased tissues.  We infiltrated with a solution of 1% lidocaine and 0.25% Marcaine.  We elliptically excised this lesion and handed off the field as a specimen.  Specimen measured 7 x 4 cm.  We raised skin flaps circumferentially and then after ensuring hemostasis, we closed the wound over a small Penrose drain using 3-0 Vicryl sutures placed in the subdermal interrupted fashion followed by 3 oh nylon sutures placed in a vertical mattress fashion.  Antibiotic ointment, Xeroform dressing, and an ABD pad were applied.  The patient tolerated the procedure without difficulty and was returned to the recovery room in stable condition.        .    Complications:  None; patient tolerated the procedure well.    Disposition: PACU - hemodynamically  stable.  Condition: stable           Attending Attestation: I was present and scrubbed for the entire procedure.    Nghia Lubin  Phone Number: [phone]

## 2024-02-08 NOTE — ANESTHESIA POSTPROCEDURE EVALUATION
Patient: Kelsey Rai    Procedure Summary       Date: 02/08/24 Room / Location: [facility] A OR 04 / Virtual [facility] A OR    Anesthesia Start: 0844 Anesthesia Stop: 0909    Procedure: Right Groin Lesion Excision (Right: Groin) Diagnosis:       Hidradenitis suppurativa      Cellulitis of right lower limb      Neoplasm of unspecified behavior of bone, soft tissue, and skin      (Hidradenitis suppurativa [L73.2])      (Cellulitis of right lower limb [L03.115])      (Neoplasm of unspecified behavior of bone, soft tissue, and skin [D49.2])    Surgeons: Nghia Lubin MD Responsible Provider: Fly Munson DO    Anesthesia Type: general ASA Status: 3            Anesthesia Type: general    Vitals Value Taken Time   /62 02/08/24 0909   Temp 36 02/08/24 0909   Pulse 86 02/08/24 0909   Resp 13 02/08/24 0909   SpO2 92 02/08/24 0909       Anesthesia Post Evaluation    Patient location during evaluation: PACU  Patient participation: complete - patient participated  Level of consciousness: awake and alert  Pain score: 0  Pain management: satisfactory to patient  Airway patency: patent  Cardiovascular status: acceptable  Respiratory status: acceptable  Hydration status: acceptable  Postoperative Nausea and Vomiting: none      There were no known notable events for this encounter.

## 2024-02-08 NOTE — ANESTHESIA PROCEDURE NOTES
Airway  Date/Time: 2/8/2024 9:38 AM  Urgency: elective    Airway not difficult    Staffing  Performed: CRNA   Authorized by: Fly Munson DO    Performed by: ANDRZEJ Iniguez-DREA  Patient location during procedure: OR    Indications and Patient Condition  Indications for airway management: anesthesia and airway protection  Spontaneous Ventilation: absent  Sedation level: deep  Preoxygenated: yes  Patient position: sniffing  MILS maintained throughout  Mask difficulty assessment: 1 - vent by mask    Final Airway Details  Final airway type: endotracheal airway      Successful airway: ETT  Cuffed: yes   Successful intubation technique: direct laryngoscopy  Facilitating devices/methods: intubating stylet and cricoid pressure  Endotracheal tube insertion site: oral  Blade: Ivan  Blade size: #3  ETT size (mm): 7.0  Cormack-Lehane Classification: grade I - full view of glottis  Placement verified by: chest auscultation and capnometry   Cuff volume (mL): 5  Measured from: teeth  ETT to teeth (cm): 21  Number of attempts at approach: 1  Ventilation between attempts: BVM    Additional Comments  Smooth IV induction, atraumatic DVL/intubation, teeth/lips intact.

## 2024-02-09 ENCOUNTER — TELEMEDICINE (OUTPATIENT)
Dept: PHARMACY | Facility: HOSPITAL | Age: 64
End: 2024-02-09
Payer: COMMERCIAL

## 2024-02-09 ENCOUNTER — PHARMACY VISIT (OUTPATIENT)
Dept: PHARMACY | Facility: CLINIC | Age: 64
End: 2024-02-09
Payer: COMMERCIAL

## 2024-02-09 DIAGNOSIS — Z79.4 TYPE 2 DIABETES MELLITUS WITHOUT COMPLICATION, WITH LONG-TERM CURRENT USE OF INSULIN (MULTI): ICD-10-CM

## 2024-02-09 DIAGNOSIS — E11.9 TYPE 2 DIABETES MELLITUS WITHOUT COMPLICATION, WITH LONG-TERM CURRENT USE OF INSULIN (MULTI): ICD-10-CM

## 2024-02-09 PROCEDURE — RXMED WILLOW AMBULATORY MEDICATION CHARGE

## 2024-02-09 RX ORDER — DULAGLUTIDE 4.5 MG/.5ML
4.5 INJECTION, SOLUTION SUBCUTANEOUS
Qty: 6 ML | Refills: 3 | Status: SHIPPED | OUTPATIENT
Start: 2024-02-09

## 2024-02-09 ASSESSMENT — ENCOUNTER SYMPTOMS: DIABETIC ASSOCIATED SYMPTOMS: 0

## 2024-02-09 ASSESSMENT — PAIN SCALES - GENERAL: PAINLEVEL_OUTOF10: 0 - NO PAIN

## 2024-02-09 NOTE — PROGRESS NOTES
Pharmacist Clinic: Diabetes Management  Kelsey Rai is a 63 y.o. female was referred to Clinical Pharmacy Team for diabetes management.   Referring Provider: Ora Ramirez, DO     Diabetes  She presents for her follow-up diabetic visit. She has type 2 diabetes mellitus. Her disease course has been stable. There are no hypoglycemic associated symptoms. There are no diabetic associated symptoms. There are no diabetic complications. Risk factors for coronary artery disease include diabetes mellitus, dyslipidemia, hypertension and obesity. She is compliant with treatment all of the time. Her weight is decreasing steadily. Meal planning includes avoidance of concentrated sweets. There is no change in her home blood glucose trend. An ACE inhibitor/angiotensin II receptor blocker is being taken. She sees a podiatrist.  Since last visit, maintained at Trulicity 3mg once weekly. Mild constipation otherwise denies s/e. Metamucil not helping constipation. Having a BM every 1.5-2 days, some straining. No significant change in blood sugars. Notices reduced appetite/portion sizes. A few lbs weight loss. Feels maintains adequate nutrition and hydration.   Had procedure yesterday for hidradenitis.   Since starting Trulicity, blood sugars have improved to point of no longer needing basal insulin. Has lost ~ 10 lbs in total.     HISTORY OF PRESENT ILLNESS  Approximate Date of Diagnosis: Does not recall  Reports no known complications due to diabetes. Does have h/o CKD, renal mass s/p nephrectomy.     Pertinent PMH Review:  - PMH of Pancreatitis: no  - PMH/FH of Medullary Thyroid Cancer: no  - PMH of Retinopathy: no  - PMH of Urinary Tract Infections: no    LAB REVIEW   Lab Results   Component Value Date    CREATININE 1.24 (H) 09/01/2023      Lab Results   Component Value Date    HGBA1C 7.9 (A) 09/01/2023    HGBA1C 8.2 (A) 11/22/2022    HGBA1C 7.5 (A) 03/24/2022     Lab Results   Component Value Date    TRIG 120 09/01/2023     TRIG 146 11/22/2022    TRIG 111 08/20/2021     The 10-year ASCVD risk score (Zenia BARILLAS, et al., 2019) is: 16.5%    Values used to calculate the score:      Age: 63 years      Sex: Female      Is Non- : Yes      Diabetic: Yes      Tobacco smoker: No      Systolic Blood Pressure: 124 mmHg      Is BP treated: Yes      HDL Cholesterol: 36 mg/dL      Total Cholesterol: 151 mg/dL     DIABETES ASSESSMENT  CURRENT PHARMACOTHERAPY  - Farxiga 10mg once daily   - Trulicity 3mg subcutaneous once weekly (Tuesdays)    Allergies: Banana, Latex, and Penicillins     SECONDARY PREVENTION  - Statin? Yes  - ACE-I/ARB? Yes    HISTORICAL PHARMACOTHERAPY  - None    SMBG MEASUREMENTS  Patient is using: glucometer Testing 1-2 times daily. Usual range 100-120.  Denies readings <80 or >200.  Patient does not report symptoms of hypoglycemia.   Patient does not report symptoms of hyperglycemia.     AFFORDABILITY/ADHERENCE   - Denies issues    Assessment/Plan   Problem List Items Addressed This Visit       Diabetes mellitus (CMS/[facility])     Patients diabetes is poorly controlled with most recent A1c of 7.9% (Goal < 7%).   Increase to Trulicity 4.5mg subcutaneous once weekly (has 3 pens left of 3mg)  Continue Farxiga 10mg once daily.  Increase fiber intake and maintain adequate hydration to help with constipation.   Discussed weight loss goals. Patient hesitant toward setting a goal weight. Discussed alternative GLP1a to optimize weight management, she would like to defer for now and trial max dosing Trulicity.  Due for A1c, renal panel at next office visit     PharmD Follow-up: 6 weeks  PCP follow up: 3/12/24    Thank you,  Soco Davidson, PharmD    Continue all meds under the continuation of care with the referring provider and clinical pharmacy team.  Verbal consent to manage patient's drug therapy was obtained. They were informed they may decline to participate or withdraw from participation in pharmacy services at any  time.

## 2024-02-12 LAB
LABORATORY COMMENT REPORT: NORMAL
PATH REPORT.FINAL DX SPEC: NORMAL
PATH REPORT.GROSS SPEC: NORMAL
PATH REPORT.RELEVANT HX SPEC: NORMAL
PATH REPORT.TOTAL CANCER: NORMAL

## 2024-02-19 DIAGNOSIS — E78.5 HYPERLIPIDEMIA, UNSPECIFIED HYPERLIPIDEMIA TYPE: ICD-10-CM

## 2024-02-19 RX ORDER — ROSUVASTATIN CALCIUM 40 MG/1
40 TABLET, COATED ORAL DAILY
Qty: 90 TABLET | Refills: 0 | Status: SHIPPED | OUTPATIENT
Start: 2024-02-19 | End: 2024-06-11 | Stop reason: SDUPTHER

## 2024-02-26 ENCOUNTER — OFFICE VISIT (OUTPATIENT)
Dept: SURGERY | Facility: CLINIC | Age: 64
End: 2024-02-26
Payer: COMMERCIAL

## 2024-02-26 VITALS
HEART RATE: 71 BPM | SYSTOLIC BLOOD PRESSURE: 109 MMHG | WEIGHT: 256 LBS | DIASTOLIC BLOOD PRESSURE: 74 MMHG | OXYGEN SATURATION: 96 % | BODY MASS INDEX: 40.18 KG/M2 | HEIGHT: 67 IN

## 2024-02-26 DIAGNOSIS — Z09 POSTOPERATIVE EXAMINATION: Primary | ICD-10-CM

## 2024-02-26 PROCEDURE — 1125F AMNT PAIN NOTED PAIN PRSNT: CPT | Performed by: SURGERY

## 2024-02-26 PROCEDURE — 3078F DIAST BP <80 MM HG: CPT | Performed by: SURGERY

## 2024-02-26 PROCEDURE — 1036F TOBACCO NON-USER: CPT | Performed by: SURGERY

## 2024-02-26 PROCEDURE — 3074F SYST BP LT 130 MM HG: CPT | Performed by: SURGERY

## 2024-02-26 PROCEDURE — 99024 POSTOP FOLLOW-UP VISIT: CPT | Performed by: SURGERY

## 2024-02-26 PROCEDURE — 4010F ACE/ARB THERAPY RXD/TAKEN: CPT | Performed by: SURGERY

## 2024-02-26 PROCEDURE — 1159F MED LIST DOCD IN RCRD: CPT | Performed by: SURGERY

## 2024-02-26 ASSESSMENT — ENCOUNTER SYMPTOMS: DEPRESSION: 0

## 2024-02-26 ASSESSMENT — PAIN SCALES - GENERAL: PAINLEVEL: 8

## 2024-02-26 NOTE — LETTER
February 26, 2024     Ora Ramirez DO  [address]  [facility] [address]    Patient: Kelsey Rai   YOB: 1960   Date of Visit: 2/26/2024       Dear Dr. Ora Ramirez DO:    Thank you for referring Kelsey Rai to me for evaluation. Below are my notes for this consultation.  If you have questions, please do not hesitate to call me. I look forward to following your patient along with you.       Sincerely,     Nghia Lubin MD      CC: No Recipients  ______________________________________________________________________________________    Subjective          Ms. Kelsey Rai is a 63 y.o. year old female who comes in today for follow-up after undergoing Wide excisional biopsy of right buttock hidradenitis    This procedure was performed on [02].    Patient is doing very well     Tolerating a regular diet, bowel movements are normal    On exam patient looks well    The incision has dehisced and the opening measures 4 x 4 x 0.5 cm.  There is a bed of heathy granulation tissue.    I removed the sutures as well as penrose drain.        Recent Results (from the past 504 hour(s))   POCT GLUCOSE    Collection Time: 02/08/24  8:21 AM   Result Value Ref Range    POCT Glucose 141 (H) 74 - 99 mg/dL   Surgical Pathology Exam    Collection Time: 02/08/24 10:12 AM   Result Value Ref Range    Case Report       Surgical Pathology                                Case: [id number]                                  Authorizing Provider:  Nghia Lubin MD          Collected:           02/08/2024 1012              Ordering Location:     [facility] OR Received:            02/08/2024 1059              Pathologist:           Mariam Gutierres MD                                                                           Specimen:    ABSCESS, right buttocks hidradentitis                                             "          FINAL DIAGNOSIS         Specimen labeled \"Right buttocks hidradenitis\":  -- Skin and subcutaneous tissue with florid acute and chronic inflammation, abscess formation, and scarring.                By the signature on this report, the individual or group listed as making the Final Interpretation/Diagnosis certifies that they have reviewed this case.       Clinical History       Pre-op diagnosis:  Hidradenitis suppurativa [L73.2]  Cellulitis of right lower limb [L03.115]  Neoplasm of unspecified behavior of bone, soft tissue, and skin [D49.2]      Gross Description       Received in formalin, labeled with the patient's name and hospital number and \"right buttocks hidradenitis\", is an oriented ellipse of dark brown-tan skin.  The surface of the skin shows areas of irregularity.  Sectioning reveals focal areas of cystic brown tissue measuring from 0.5 to 0.9 cm in greatest dimension.  Representative sections are submitted in 3 cassettes  [facility]  Department of Pathology  [address] [place] [id number]  Phone: [phone] Fax: [phone]       POCT GLUCOSE    Collection Time: 02/08/24 10:59 AM   Result Value Ref Range    POCT Glucose 113 (H) 74 - 99 mg/dL          Impression: Doing well following Wide excisional biopsy of right buttock hidradenitis    has dehisced wound and the opening measures 4 x 4 x 0.5 cm.  There is a bed of heathy granulation tissue.      I have recommended sitz baths twice daily.  Apply a normal sailine moistened gauze once daily.     Follow-up with me in 2 weeks     Nghia Lubin MD 02/26/24 10:40 AM   "

## 2024-02-26 NOTE — PROGRESS NOTES
"Subjective           Ms. Kelsey Rai is a 63 y.o. year old female who comes in today for follow-up after undergoing Wide excisional biopsy of right buttock hidradenitis    This procedure was performed on [02].    Patient is doing very well     Tolerating a regular diet, bowel movements are normal    On exam patient looks well    The incision has dehisced and the opening measures 4 x 4 x 0.5 cm.  There is a bed of heathy granulation tissue.    I removed the sutures as well as penrose drain.        Recent Results (from the past 504 hour(s))   POCT GLUCOSE    Collection Time: 02/08/24  8:21 AM   Result Value Ref Range    POCT Glucose 141 (H) 74 - 99 mg/dL   Surgical Pathology Exam    Collection Time: 02/08/24 10:12 AM   Result Value Ref Range    Case Report       Surgical Pathology                                Case: [id number]                                  Authorizing Provider:  Nghia Lubin MD          Collected:           02/08/2024 1012              Ordering Location:     [facility] OR Received:            02/08/2024 1059              Pathologist:           Mariam Gutierres MD                                                                           Specimen:    ABSCESS, right buttocks hidradentitis                                                      FINAL DIAGNOSIS         Specimen labeled \"Right buttocks hidradenitis\":  -- Skin and subcutaneous tissue with florid acute and chronic inflammation, abscess formation, and scarring.                By the signature on this report, the individual or group listed as making the Final Interpretation/Diagnosis certifies that they have reviewed this case.       Clinical History       Pre-op diagnosis:  Hidradenitis suppurativa [L73.2]  Cellulitis of right lower limb [L03.115]  Neoplasm of unspecified behavior of bone, soft tissue, and skin [D49.2]      Gross Description " "      Received in formalin, labeled with the patient's name and hospital number and \"right buttocks hidradenitis\", is an oriented ellipse of dark brown-tan skin.  The surface of the skin shows areas of irregularity.  Sectioning reveals focal areas of cystic brown tissue measuring from 0.5 to 0.9 cm in greatest dimension.  Representative sections are submitted in 3 cassettes  MEK    [facility]  Department of Pathology  [facility] [place] [address]  Phone: [phone] Fax: [phone]       POCT GLUCOSE    Collection Time: 02/08/24 10:59 AM   Result Value Ref Range    POCT Glucose 113 (H) 74 - 99 mg/dL          Impression: Doing well following Wide excisional biopsy of right buttock hidradenitis    has dehisced wound and the opening measures 4 x 4 x 0.5 cm.  There is a bed of heathy granulation tissue.      I have recommended sitz baths twice daily.  Apply a normal sailine moistened gauze once daily.     Follow-up with me in 2 weeks     Nghia Lubin MD 02/26/24 10:40 AM   "

## 2024-03-04 ENCOUNTER — PHARMACY VISIT (OUTPATIENT)
Dept: PHARMACY | Facility: CLINIC | Age: 64
End: 2024-03-04
Payer: COMMERCIAL

## 2024-03-04 PROCEDURE — RXMED WILLOW AMBULATORY MEDICATION CHARGE

## 2024-03-11 ENCOUNTER — OFFICE VISIT (OUTPATIENT)
Dept: SURGERY | Facility: CLINIC | Age: 64
End: 2024-03-11
Payer: COMMERCIAL

## 2024-03-11 VITALS
HEART RATE: 84 BPM | SYSTOLIC BLOOD PRESSURE: 104 MMHG | BODY MASS INDEX: 40.88 KG/M2 | OXYGEN SATURATION: 97 % | TEMPERATURE: 97.6 F | DIASTOLIC BLOOD PRESSURE: 64 MMHG | WEIGHT: 261 LBS

## 2024-03-11 DIAGNOSIS — Z09 POSTOPERATIVE EXAMINATION: Primary | ICD-10-CM

## 2024-03-11 PROCEDURE — 3074F SYST BP LT 130 MM HG: CPT | Performed by: SURGERY

## 2024-03-11 PROCEDURE — 1159F MED LIST DOCD IN RCRD: CPT | Performed by: SURGERY

## 2024-03-11 PROCEDURE — 4010F ACE/ARB THERAPY RXD/TAKEN: CPT | Performed by: SURGERY

## 2024-03-11 PROCEDURE — 1125F AMNT PAIN NOTED PAIN PRSNT: CPT | Performed by: SURGERY

## 2024-03-11 PROCEDURE — 99024 POSTOP FOLLOW-UP VISIT: CPT | Performed by: SURGERY

## 2024-03-11 PROCEDURE — 3078F DIAST BP <80 MM HG: CPT | Performed by: SURGERY

## 2024-03-11 PROCEDURE — 1036F TOBACCO NON-USER: CPT | Performed by: SURGERY

## 2024-03-11 ASSESSMENT — ENCOUNTER SYMPTOMS: DEPRESSION: 0

## 2024-03-11 ASSESSMENT — PAIN SCALES - GENERAL: PAINLEVEL: 8

## 2024-03-11 NOTE — PROGRESS NOTES
Subjective   Patient ID: Kelsey Rai is a 63 y.o. female who presents for Post-op.    Ms. Kelsey Rai is a 63 y.o. year old female who comes in today for follow-up after undergoing Wide excisional biopsy of right buttock hidradenitis    This procedure was performed on [02].     Patient is doing very well      Tolerating a regular diet, bowel movements are normal     On exam patient looks well     The incision has dehisced and the opening measures 3.5  cm round.  There is a bed of heathy granulation tissue.        I have recommended sitz baths twice daily.  Apply a normal sailine moistened gauze once daily.      Follow-up with me in 3 weeks         Nghia Lubin MD 03/11/24 10:29 AM

## 2024-03-12 ENCOUNTER — OFFICE VISIT (OUTPATIENT)
Dept: PRIMARY CARE | Facility: CLINIC | Age: 64
End: 2024-03-12
Payer: COMMERCIAL

## 2024-03-12 VITALS — WEIGHT: 259 LBS | DIASTOLIC BLOOD PRESSURE: 68 MMHG | BODY MASS INDEX: 40.57 KG/M2 | SYSTOLIC BLOOD PRESSURE: 100 MMHG

## 2024-03-12 DIAGNOSIS — E66.01 MORBID OBESITY (MULTI): ICD-10-CM

## 2024-03-12 DIAGNOSIS — Z79.4 TYPE 2 DIABETES MELLITUS WITHOUT COMPLICATION, WITH LONG-TERM CURRENT USE OF INSULIN (MULTI): Primary | ICD-10-CM

## 2024-03-12 DIAGNOSIS — E11.9 TYPE 2 DIABETES MELLITUS WITHOUT COMPLICATION, WITHOUT LONG-TERM CURRENT USE OF INSULIN (MULTI): ICD-10-CM

## 2024-03-12 DIAGNOSIS — E11.9 TYPE 2 DIABETES MELLITUS WITHOUT COMPLICATION, WITH LONG-TERM CURRENT USE OF INSULIN (MULTI): Primary | ICD-10-CM

## 2024-03-12 DIAGNOSIS — C64.1 RENAL CELL CARCINOMA, RIGHT (MULTI): ICD-10-CM

## 2024-03-12 PROCEDURE — 1159F MED LIST DOCD IN RCRD: CPT | Performed by: INTERNAL MEDICINE

## 2024-03-12 PROCEDURE — G0439 PPPS, SUBSEQ VISIT: HCPCS | Performed by: INTERNAL MEDICINE

## 2024-03-12 PROCEDURE — 1125F AMNT PAIN NOTED PAIN PRSNT: CPT | Performed by: INTERNAL MEDICINE

## 2024-03-12 PROCEDURE — 99214 OFFICE O/P EST MOD 30 MIN: CPT | Performed by: INTERNAL MEDICINE

## 2024-03-12 PROCEDURE — 1158F ADVNC CARE PLAN TLK DOCD: CPT | Performed by: INTERNAL MEDICINE

## 2024-03-12 PROCEDURE — 3078F DIAST BP <80 MM HG: CPT | Performed by: INTERNAL MEDICINE

## 2024-03-12 PROCEDURE — 1170F FXNL STATUS ASSESSED: CPT | Performed by: INTERNAL MEDICINE

## 2024-03-12 PROCEDURE — 1036F TOBACCO NON-USER: CPT | Performed by: INTERNAL MEDICINE

## 2024-03-12 PROCEDURE — 1123F ACP DISCUSS/DSCN MKR DOCD: CPT | Performed by: INTERNAL MEDICINE

## 2024-03-12 PROCEDURE — 4010F ACE/ARB THERAPY RXD/TAKEN: CPT | Performed by: INTERNAL MEDICINE

## 2024-03-12 PROCEDURE — 1160F RVW MEDS BY RX/DR IN RCRD: CPT | Performed by: INTERNAL MEDICINE

## 2024-03-12 PROCEDURE — 3074F SYST BP LT 130 MM HG: CPT | Performed by: INTERNAL MEDICINE

## 2024-03-12 ASSESSMENT — ACTIVITIES OF DAILY LIVING (ADL)
GROCERY_SHOPPING: INDEPENDENT
MANAGING_FINANCES: INDEPENDENT
MANAGING_FINANCES: INDEPENDENT
TAKING_MEDICATION: INDEPENDENT
DOING_HOUSEWORK: INDEPENDENT
DRESSING: INDEPENDENT
DOING_HOUSEWORK: INDEPENDENT
GROCERY_SHOPPING: INDEPENDENT
TAKING_MEDICATION: INDEPENDENT
BATHING: INDEPENDENT

## 2024-03-12 ASSESSMENT — ENCOUNTER SYMPTOMS
LOSS OF SENSATION IN FEET: 0
OCCASIONAL FEELINGS OF UNSTEADINESS: 0
DEPRESSION: 0

## 2024-03-12 ASSESSMENT — PATIENT HEALTH QUESTIONNAIRE - PHQ9
2. FEELING DOWN, DEPRESSED OR HOPELESS: NOT AT ALL
SUM OF ALL RESPONSES TO PHQ9 QUESTIONS 1 AND 2: 0
1. LITTLE INTEREST OR PLEASURE IN DOING THINGS: NOT AT ALL
SUM OF ALL RESPONSES TO PHQ9 QUESTIONS 1 AND 2: 0
1. LITTLE INTEREST OR PLEASURE IN DOING THINGS: NOT AT ALL
2. FEELING DOWN, DEPRESSED OR HOPELESS: NOT AT ALL

## 2024-03-12 NOTE — PROGRESS NOTES
Subjective   Kelsey Rai is a 63 y.o. female.    Chief Complaint:  Medicare Annual Wellness Visit Subsequent (Complains of Right Side pain)    HPI  Kelsey Rai is a 63 y.o. female with a medical history of T2DM, HTN, HLD, rt renal mass s/p nephrectomy, recurrent nephrolithiasis, and CKD stage 3a who is here today for follow up visit. Pt recently underwent surgery for hidradenitis, no complications and is recovering well. She is taking tylenol for pain now after completing course of oxy, pain is well controlled. She denies any other acute concerns or complaints. She is having regular bowel movements. No CP, sob, cough, n/v, f/c, headaches, dizziness, or abdominal pain.       Medicare Wellness Billing Compliance Satisfied    *This is a visual tool to show completion of required items on the day of the visit. Green checks will only appear on the date of visit.    Review all medications by prescribing practitioner or clinical pharmacist (such as prescriptions, OTCs, herbal therapies and supplements) documented in the medical record    Past Medical, Surgical, and Family History reviewed and updated in chart    Tobacco Use Reviewed    Alcohol Use Reviewed    Illicit Drug Use Reviewed    PHQ2/9    Falls in Last Year Reviewed    Home Safety Risk Factors Reviewed    Cognitive Impairment Reviewed    Patient Self Assessment and Health Status    Current Diet Reviewed    Exercise Frequency    ADL - Hearing Impairment    ADL - Bathing    ADL - Dressing    ADL - Walks in Home    IADL - Managing Finances    IADL - Grocery Shopping    IADL - Taking Medications    IADL - Doing Housework      ROS  12 point ROS otherwise negative    Objective   Vitals reviewed.   Constitutional:       Appearance: Healthy appearance. Not in distress.   Eyes:      Conjunctiva/sclera: Conjunctivae normal.   Pulmonary:      Effort: Pulmonary effort is normal.      Breath sounds: Normal breath sounds.   Cardiovascular:       Normal rate. Regular rhythm.      Murmurs: There is no murmur.   Pulses:     Intact distal pulses.   Edema:     Peripheral edema absent.   Musculoskeletal: Normal range of motion.      Cervical back: Normal range of motion. Skin:     General: Skin is warm and dry.   Neurological:      Mental Status: Alert and oriented to person, place and time.       Assessment/Plan   There were no encounter diagnoses.  The  pt is a 63 year old female with a pmh of T2DM, HTN, HLD, rt renal mass s/p nephrectomy, recurrent nephrolithiasis, and CKD stage 3a who came in for a routine followup and refill of medications.     # DM2  - continue trulicity and farxiga  - A1c in 9/23 7.9  - Will recheck A1c  - Podiatry and optho referral placed    # HLD  - Continue rosuvastatin 40 mg daily  - Will get lipid panel at follow up visit  - discussed maintaining healthy diet and frequent exercise    # HTN  - BP well controlled, continue on hydrochlorothiazide and lisinopril     # CKD3  - Cr. 1.24  - Avoid nephrotoxic medications including NSAIDS  - Encourage liberal intake of fluid, no salt    # HM  - Colonoscopy in 2014, repeat 2024  - Mammogram in 7/23, repeat in July  - DEXA 7/23, repeat in July 2025  - Labs at next visit: CBC, CMP, lipid panel, A1c, TSH, Vit D  - Low dose CT: will order at follow up visit  - Follow up in 3 months

## 2024-03-22 ENCOUNTER — TELEMEDICINE (OUTPATIENT)
Dept: PHARMACY | Facility: HOSPITAL | Age: 64
End: 2024-03-22
Payer: COMMERCIAL

## 2024-03-22 DIAGNOSIS — E11.9 TYPE 2 DIABETES MELLITUS WITHOUT COMPLICATION, WITH LONG-TERM CURRENT USE OF INSULIN (MULTI): ICD-10-CM

## 2024-03-22 DIAGNOSIS — Z79.4 TYPE 2 DIABETES MELLITUS WITHOUT COMPLICATION, WITH LONG-TERM CURRENT USE OF INSULIN (MULTI): ICD-10-CM

## 2024-03-22 DIAGNOSIS — E11.22 TYPE 2 DIABETES MELLITUS WITH CHRONIC KIDNEY DISEASE, WITH LONG-TERM CURRENT USE OF INSULIN, UNSPECIFIED CKD STAGE (MULTI): ICD-10-CM

## 2024-03-22 DIAGNOSIS — Z79.4 TYPE 2 DIABETES MELLITUS WITH CHRONIC KIDNEY DISEASE, WITH LONG-TERM CURRENT USE OF INSULIN, UNSPECIFIED CKD STAGE (MULTI): ICD-10-CM

## 2024-03-22 RX ORDER — DAPAGLIFLOZIN 10 MG/1
10 TABLET, FILM COATED ORAL DAILY
Qty: 90 TABLET | Refills: 3 | Status: SHIPPED | OUTPATIENT
Start: 2024-03-22

## 2024-03-22 RX ORDER — BLOOD-GLUCOSE METER
EACH MISCELLANEOUS
Qty: 100 STRIP | Refills: 6 | Status: SHIPPED | OUTPATIENT
Start: 2024-03-22

## 2024-03-22 RX ORDER — LANCETS 33 GAUGE
EACH MISCELLANEOUS
Qty: 100 EACH | Refills: 6 | Status: SHIPPED | OUTPATIENT
Start: 2024-03-22

## 2024-03-22 ASSESSMENT — ENCOUNTER SYMPTOMS: DIABETIC ASSOCIATED SYMPTOMS: 0

## 2024-03-22 NOTE — PROGRESS NOTES
Pharmacist Clinic: Diabetes Management  Kelsey Rai is a 63 y.o. female was referred to Clinical Pharmacy Team for diabetes management.   Referring Provider: Ora Ramirez,      Diabetes  She presents for her follow-up diabetic visit. She has type 2 diabetes mellitus. Her disease course has been stable. There are no hypoglycemic associated symptoms. There are no diabetic associated symptoms. There are no diabetic complications. Risk factors for coronary artery disease include diabetes mellitus, dyslipidemia, hypertension and obesity. She is compliant with treatment all of the time. Her weight is decreasing steadily. Meal planning includes avoidance of concentrated sweets. There is no change in her home blood glucose trend. An ACE inhibitor/angiotensin II receptor blocker is being taken. She sees a podiatrist.  Since last visit, Trulicity dose increased to 4.5mg weekly. Constipation resolved, reports regular bowel movements with sennokot one tab daily. Denies GI s/e.  Since starting Trulicity, blood sugars have improved to point of no longer needing basal insulin. Baseline weight at Trulicity initiation 278 lbs --> current weight on max dose 259 lbs    HISTORY OF PRESENT ILLNESS  Approximate Date of Diagnosis: Does not recall  Reports no known complications due to diabetes. Does have h/o CKD, renal mass s/p nephrectomy.     Pertinent PMH Review:  - PMH of Pancreatitis: no  - PMH/FH of Medullary Thyroid Cancer: no  - PMH of Retinopathy: no  - PMH of Urinary Tract Infections: no    LAB REVIEW   Lab Results   Component Value Date    CREATININE 1.24 (H) 09/01/2023      Lab Results   Component Value Date    HGBA1C 7.9 (A) 09/01/2023    HGBA1C 8.2 (A) 11/22/2022    HGBA1C 7.5 (A) 03/24/2022     Lab Results   Component Value Date    TRIG 120 09/01/2023    TRIG 146 11/22/2022    TRIG 111 08/20/2021     The 10-year ASCVD risk score (Zenia BARILLAS, et al., 2019) is: 10.1%    Values used to calculate the score:      Age:  63 years      Sex: Female      Is Non- : Yes      Diabetic: Yes      Tobacco smoker: No      Systolic Blood Pressure: 100 mmHg      Is BP treated: Yes      HDL Cholesterol: 36 mg/dL      Total Cholesterol: 151 mg/dL     DIABETES ASSESSMENT  CURRENT PHARMACOTHERAPY  - Farxiga 10mg once daily   - Trulicity 4.5mg subcutaneous once weekly (Tuesdays)    Allergies: Banana, Latex, and Penicillins     SECONDARY PREVENTION  - Statin? Yes  - ACE-I/ARB? Yes    HISTORICAL PHARMACOTHERAPY  - None    SMBG MEASUREMENTS  Patient is using: glucometer Onetouch Testing 1-2 times daily. Usual range 100-120.  AM FBG today 126  Denies readings <80 or >200.  Patient does not report symptoms of hypoglycemia.   Patient does not report symptoms of hyperglycemia.     AFFORDABILITY/ADHERENCE   - Denies issues    Assessment/Plan   Problem List Items Addressed This Visit       Diabetes mellitus (CMS/[facility])   Patients diabetes is poorly controlled with most recent A1c of 7.9% (Goal < 7%).   Continue Trulicity 4.5mg subcutaneous once weekly   Continue Farxiga 10mg once daily  Increase fiber intake and maintain adequate hydration to help with constipation.   Discussed weight loss goals. Patient hesitant toward setting a goal weight. Discussed alternative GLP1a to optimize weight management, she would like to defer for now and trial max dosing Trulicity.  Refills sent for testing supplies  A1c, renal panel ordered - advised to complete     PharmD Follow-up: 4 months - continued follow up per pt request  PCP follow up: TBD    Thank you,  Soco Davidson, PharmD    Continue all meds under the continuation of care with the referring provider and clinical pharmacy team.  Verbal consent to manage patient's drug therapy was obtained. They were informed they may decline to participate or withdraw from participation in pharmacy services at any time.

## 2024-04-08 ENCOUNTER — APPOINTMENT (OUTPATIENT)
Dept: SURGERY | Facility: CLINIC | Age: 64
End: 2024-04-08
Payer: COMMERCIAL

## 2024-04-10 ENCOUNTER — OFFICE VISIT (OUTPATIENT)
Dept: SURGERY | Facility: CLINIC | Age: 64
End: 2024-04-10
Payer: COMMERCIAL

## 2024-04-10 VITALS
TEMPERATURE: 98 F | DIASTOLIC BLOOD PRESSURE: 77 MMHG | OXYGEN SATURATION: 97 % | WEIGHT: 262 LBS | SYSTOLIC BLOOD PRESSURE: 133 MMHG | HEART RATE: 91 BPM | BODY MASS INDEX: 41.04 KG/M2

## 2024-04-10 DIAGNOSIS — Z09 POSTOPERATIVE EXAMINATION: Primary | ICD-10-CM

## 2024-04-10 DIAGNOSIS — L73.2 HIDRADENITIS SUPPURATIVA: ICD-10-CM

## 2024-04-10 PROCEDURE — 4010F ACE/ARB THERAPY RXD/TAKEN: CPT | Performed by: SURGERY

## 2024-04-10 PROCEDURE — 99024 POSTOP FOLLOW-UP VISIT: CPT | Performed by: SURGERY

## 2024-04-10 PROCEDURE — 3078F DIAST BP <80 MM HG: CPT | Performed by: SURGERY

## 2024-04-10 PROCEDURE — 3075F SYST BP GE 130 - 139MM HG: CPT | Performed by: SURGERY

## 2024-04-10 PROCEDURE — 1036F TOBACCO NON-USER: CPT | Performed by: SURGERY

## 2024-04-10 PROCEDURE — 1160F RVW MEDS BY RX/DR IN RCRD: CPT | Performed by: SURGERY

## 2024-04-10 PROCEDURE — 1159F MED LIST DOCD IN RCRD: CPT | Performed by: SURGERY

## 2024-04-10 PROCEDURE — 1125F AMNT PAIN NOTED PAIN PRSNT: CPT | Performed by: SURGERY

## 2024-04-10 RX ORDER — DOXYCYCLINE 100 MG/1
100 CAPSULE ORAL 2 TIMES DAILY
Status: SHIPPED | OUTPATIENT
Start: 2024-04-10

## 2024-04-10 ASSESSMENT — ENCOUNTER SYMPTOMS: DEPRESSION: 0

## 2024-04-10 ASSESSMENT — PAIN SCALES - GENERAL: PAINLEVEL: 4

## 2024-04-10 NOTE — PROGRESS NOTES
Subjective     Ms. Kelsey Rai is a 63 y.o. year old female who comes in today for follow-up after undergoing Wide excisional biopsy of right buttock hidradenitis          Patient is doing very well      Tolerating a regular diet, bowel movements are normal     On exam patient looks well     Wound right buttock just about healed over.  Some induration left buttock .     I have recommended sitz baths twice daily.  Apply a normal sailine moistened gauze once daily.     Will Rx doxycycline      Follow-up with me in 3 weeks    Nghia Lubin MD 04/10/24 5:06 PM

## 2024-04-26 ENCOUNTER — PHARMACY VISIT (OUTPATIENT)
Dept: PHARMACY | Facility: CLINIC | Age: 64
End: 2024-04-26
Payer: COMMERCIAL

## 2024-04-26 PROCEDURE — RXMED WILLOW AMBULATORY MEDICATION CHARGE

## 2024-05-10 ENCOUNTER — OFFICE VISIT (OUTPATIENT)
Dept: SURGERY | Facility: CLINIC | Age: 64
End: 2024-05-10
Payer: COMMERCIAL

## 2024-05-10 VITALS
SYSTOLIC BLOOD PRESSURE: 121 MMHG | WEIGHT: 262 LBS | TEMPERATURE: 97 F | HEART RATE: 90 BPM | BODY MASS INDEX: 41.04 KG/M2 | DIASTOLIC BLOOD PRESSURE: 78 MMHG

## 2024-05-10 DIAGNOSIS — Z09 POSTOPERATIVE EXAMINATION: Primary | ICD-10-CM

## 2024-05-10 DIAGNOSIS — L73.2 HIDRADENITIS SUPPURATIVA: ICD-10-CM

## 2024-05-10 PROCEDURE — 3074F SYST BP LT 130 MM HG: CPT | Performed by: SURGERY

## 2024-05-10 PROCEDURE — 3078F DIAST BP <80 MM HG: CPT | Performed by: SURGERY

## 2024-05-10 PROCEDURE — 1159F MED LIST DOCD IN RCRD: CPT | Performed by: SURGERY

## 2024-05-10 PROCEDURE — 1036F TOBACCO NON-USER: CPT | Performed by: SURGERY

## 2024-05-10 PROCEDURE — 1160F RVW MEDS BY RX/DR IN RCRD: CPT | Performed by: SURGERY

## 2024-05-10 PROCEDURE — 99024 POSTOP FOLLOW-UP VISIT: CPT | Performed by: SURGERY

## 2024-05-10 PROCEDURE — 4010F ACE/ARB THERAPY RXD/TAKEN: CPT | Performed by: SURGERY

## 2024-05-10 RX ORDER — MINOCYCLINE HYDROCHLORIDE 50 MG/1
50 TABLET ORAL 2 TIMES DAILY
Qty: 60 TABLET | Refills: 0 | Status: SHIPPED | OUTPATIENT
Start: 2024-05-10 | End: 2024-06-09

## 2024-05-10 ASSESSMENT — ENCOUNTER SYMPTOMS: DEPRESSION: 0

## 2024-05-10 NOTE — PROGRESS NOTES
Subjective   Patient ID: Kelsey Rai is a 64 y.o. female who presents for Post-op.  HPI    Review of Systems    Objective   Physical Exam    Ms. Kelsey Rai is a 63 y.o. year old female who comes in today for follow-up after undergoing Wide excisional biopsy of right buttock hidradenitis          Patient is doing very well      Tolerating a regular diet, bowel movements are normal     On exam patient looks well     Wound right buttock just about healed over.  Some induration left buttock .  Improved.     I have recommended sitz bath.       Will Rx minocycline     Follow-up with me in 3 months  Assessment/Plan            Nghia Lubin MD 05/10/24 10:38 AM

## 2024-05-13 PROCEDURE — RXMED WILLOW AMBULATORY MEDICATION CHARGE

## 2024-05-14 ENCOUNTER — PHARMACY VISIT (OUTPATIENT)
Dept: PHARMACY | Facility: CLINIC | Age: 64
End: 2024-05-14
Payer: COMMERCIAL

## 2024-06-10 ENCOUNTER — APPOINTMENT (OUTPATIENT)
Dept: SURGERY | Facility: CLINIC | Age: 64
End: 2024-06-10
Payer: COMMERCIAL

## 2024-06-10 PROCEDURE — RXMED WILLOW AMBULATORY MEDICATION CHARGE

## 2024-06-11 DIAGNOSIS — E78.5 HYPERLIPIDEMIA, UNSPECIFIED HYPERLIPIDEMIA TYPE: ICD-10-CM

## 2024-06-11 RX ORDER — ROSUVASTATIN CALCIUM 40 MG/1
40 TABLET, COATED ORAL DAILY
Qty: 90 TABLET | Refills: 0 | Status: SHIPPED | OUTPATIENT
Start: 2024-06-11 | End: 2024-12-08

## 2024-06-13 ENCOUNTER — PHARMACY VISIT (OUTPATIENT)
Dept: PHARMACY | Facility: CLINIC | Age: 64
End: 2024-06-13
Payer: COMMERCIAL

## 2024-06-21 ENCOUNTER — APPOINTMENT (OUTPATIENT)
Dept: PHARMACY | Facility: HOSPITAL | Age: 64
End: 2024-06-21
Payer: COMMERCIAL

## 2024-06-21 DIAGNOSIS — E11.9 TYPE 2 DIABETES MELLITUS WITHOUT COMPLICATION, WITH LONG-TERM CURRENT USE OF INSULIN (MULTI): ICD-10-CM

## 2024-06-21 DIAGNOSIS — Z79.4 TYPE 2 DIABETES MELLITUS WITHOUT COMPLICATION, WITH LONG-TERM CURRENT USE OF INSULIN (MULTI): ICD-10-CM

## 2024-06-21 ASSESSMENT — ENCOUNTER SYMPTOMS: DIABETIC ASSOCIATED SYMPTOMS: 0

## 2024-06-21 NOTE — PROGRESS NOTES
Pharmacist Clinic: Diabetes Management  Kelsey Rai is a 64 y.o. female was referred to Clinical Pharmacy Team for diabetes management.   Referring Provider: Ora Ramirez,      Diabetes  She presents for her follow-up diabetic visit. She has type 2 diabetes mellitus. Her disease course has been stable. There are no hypoglycemic associated symptoms. There are no diabetic associated symptoms. There are no diabetic complications. Risk factors for coronary artery disease include diabetes mellitus, dyslipidemia, hypertension and obesity. She is compliant with treatment all of the time. Her weight is decreasing steadily. Meal planning includes avoidance of concentrated sweets. There is no change in her home blood glucose trend. An ACE inhibitor/angiotensin II receptor blocker is being taken. She sees a podiatrist.  Since last visit, Trulicity and Farxiga maintained at max dosing. Reports sugars are stable and AM FBG at goal. Denies GI side effects, takes senokot daily for constipation. Sadly her daughter passed away last month and she has been grieving however reports has remained on track with medications. No missed doses. She has not been able to complete labs yet.    Since starting Trulicity, blood sugars have improved to point of no longer needing basal insulin. Baseline weight at Trulicity initiation 278 lbs --> current weight on max dose 259 lbs    HISTORY OF PRESENT ILLNESS  Approximate Date of Diagnosis: Does not recall  Reports no known complications due to diabetes. Does have h/o CKD, renal mass s/p nephrectomy.     Pertinent PMH Review:  - PMH of Pancreatitis: no  - PMH/FH of Medullary Thyroid Cancer: no  - PMH of Retinopathy: no  - PMH of Urinary Tract Infections: no    LAB REVIEW   Lab Results   Component Value Date    CREATININE 1.24 (H) 09/01/2023      Lab Results   Component Value Date    HGBA1C 7.9 (A) 09/01/2023    HGBA1C 8.2 (A) 11/22/2022    HGBA1C 7.5 (A) 03/24/2022     Lab Results    Component Value Date    TRIG 120 09/01/2023    TRIG 146 11/22/2022    TRIG 111 08/20/2021     The 10-year ASCVD risk score (Zenia DK, et al., 2019) is: 16.2%    Values used to calculate the score:      Age: 64 years      Sex: Female      Is Non- : Yes      Diabetic: Yes      Tobacco smoker: No      Systolic Blood Pressure: 121 mmHg      Is BP treated: Yes      HDL Cholesterol: 36 mg/dL      Total Cholesterol: 151 mg/dL     DIABETES ASSESSMENT  CURRENT PHARMACOTHERAPY  - Farxiga 10mg once daily   - Trulicity 4.5mg subcutaneous once weekly (Tuesdays)    Allergies: Banana, Latex, and Penicillins     SECONDARY PREVENTION  - Statin? Yes  - ACE-I/ARB? Yes    HISTORICAL PHARMACOTHERAPY  - Lantus, metformin (replaced with Trulicity)    SMBG MEASUREMENTS  Patient is using: glucometer Onetouch Testing 1-2 times daily. Usual range 100-120.  Denies readings <80 or >200.  Patient does not report symptoms of hypoglycemia.   Patient does not report symptoms of hyperglycemia.     AFFORDABILITY/ADHERENCE   - Denies issues    Assessment/Plan   Problem List Items Addressed This Visit       Diabetes mellitus (Multi)    Relevant Orders    Follow Up In Clinical Pharmacy     Patients diabetes is poorly controlled (improving per home BG, needs labs)  with most recent A1c of 7.9% (Goal < 7%).   Continue Trulicity 4.5mg subcutaneous once weekly and Farxiga 10mg once daily  Increase fiber intake and maintain adequate hydration to help with constipation  A1c, renal panel pending collection, states will obtain prior to upcoming PCP appt    PharmD Follow-up: 9/20/24  PCP follow up: 7/25/24    Thank you,  Soco Davidson, PharmD    Continue all meds under the continuation of care with the referring provider and clinical pharmacy team.  Verbal consent to manage patient's drug therapy was obtained. They were informed they may decline to participate or withdraw from participation in pharmacy services at any time.

## 2024-06-21 NOTE — Clinical Note
Continues to do well with farxiga and trulicity, BG stable, no changes today. Notified of lab orders. PCP FU 7/25

## 2024-06-25 ENCOUNTER — LAB (OUTPATIENT)
Dept: LAB | Facility: LAB | Age: 64
End: 2024-06-25
Payer: COMMERCIAL

## 2024-06-25 DIAGNOSIS — Z79.4 TYPE 2 DIABETES MELLITUS WITHOUT COMPLICATION, WITH LONG-TERM CURRENT USE OF INSULIN (MULTI): ICD-10-CM

## 2024-06-25 DIAGNOSIS — Z79.4 TYPE 2 DIABETES MELLITUS WITH CHRONIC KIDNEY DISEASE, WITH LONG-TERM CURRENT USE OF INSULIN, UNSPECIFIED CKD STAGE (MULTI): ICD-10-CM

## 2024-06-25 DIAGNOSIS — E11.9 TYPE 2 DIABETES MELLITUS WITHOUT COMPLICATION, WITH LONG-TERM CURRENT USE OF INSULIN (MULTI): ICD-10-CM

## 2024-06-25 DIAGNOSIS — I10 HYPERTENSION, UNSPECIFIED TYPE: ICD-10-CM

## 2024-06-25 DIAGNOSIS — E78.5 HYPERLIPIDEMIA, UNSPECIFIED HYPERLIPIDEMIA TYPE: ICD-10-CM

## 2024-06-25 DIAGNOSIS — E11.22 TYPE 2 DIABETES MELLITUS WITH CHRONIC KIDNEY DISEASE, WITH LONG-TERM CURRENT USE OF INSULIN, UNSPECIFIED CKD STAGE (MULTI): ICD-10-CM

## 2024-06-25 DIAGNOSIS — L73.2 HIDRADENITIS SUPPURATIVA: Primary | ICD-10-CM

## 2024-06-25 DIAGNOSIS — K59.09 CHRONIC CONSTIPATION: ICD-10-CM

## 2024-06-25 LAB
ALBUMIN SERPL BCP-MCNC: 3.7 G/DL (ref 3.4–5)
ALP SERPL-CCNC: 59 U/L (ref 33–136)
ALT SERPL W P-5'-P-CCNC: 13 U/L (ref 7–45)
ANION GAP SERPL CALC-SCNC: 11 MMOL/L (ref 10–20)
AST SERPL W P-5'-P-CCNC: 15 U/L (ref 9–39)
BILIRUB SERPL-MCNC: 0.5 MG/DL (ref 0–1.2)
BUN SERPL-MCNC: 19 MG/DL (ref 6–23)
CALCIUM SERPL-MCNC: 9.1 MG/DL (ref 8.6–10.6)
CHLORIDE SERPL-SCNC: 103 MMOL/L (ref 98–107)
CO2 SERPL-SCNC: 30 MMOL/L (ref 21–32)
CREAT SERPL-MCNC: 1.02 MG/DL (ref 0.5–1.05)
EGFRCR SERPLBLD CKD-EPI 2021: 61 ML/MIN/1.73M*2
EST. AVERAGE GLUCOSE BLD GHB EST-MCNC: 134 MG/DL
GLUCOSE SERPL-MCNC: 100 MG/DL (ref 74–99)
HBA1C MFR BLD: 6.3 %
POTASSIUM SERPL-SCNC: 4.1 MMOL/L (ref 3.5–5.3)
PROT SERPL-MCNC: 7.1 G/DL (ref 6.4–8.2)
SODIUM SERPL-SCNC: 140 MMOL/L (ref 136–145)

## 2024-06-25 PROCEDURE — 36415 COLL VENOUS BLD VENIPUNCTURE: CPT

## 2024-06-25 PROCEDURE — 80053 COMPREHEN METABOLIC PANEL: CPT

## 2024-06-25 PROCEDURE — 83036 HEMOGLOBIN GLYCOSYLATED A1C: CPT

## 2024-06-25 RX ORDER — HYDROCHLOROTHIAZIDE 25 MG/1
25 TABLET ORAL DAILY
Qty: 90 TABLET | Refills: 1 | Status: SHIPPED | OUTPATIENT
Start: 2024-06-25

## 2024-06-25 RX ORDER — CLINDAMYCIN PHOSPHATE 10 UG/ML
LOTION TOPICAL
Qty: 200 ML | Refills: 1 | Status: SHIPPED | OUTPATIENT
Start: 2024-06-25

## 2024-06-25 RX ORDER — LISINOPRIL 10 MG/1
10 TABLET ORAL DAILY
Qty: 90 TABLET | Refills: 1 | Status: SHIPPED | OUTPATIENT
Start: 2024-06-25

## 2024-06-25 RX ORDER — ROSUVASTATIN CALCIUM 40 MG/1
40 TABLET, COATED ORAL DAILY
Qty: 90 TABLET | Refills: 1 | Status: SHIPPED | OUTPATIENT
Start: 2024-06-25 | End: 2024-12-22

## 2024-06-25 RX ORDER — LANCETS 33 GAUGE
EACH MISCELLANEOUS
Qty: 100 EACH | Refills: 6 | Status: SHIPPED | OUTPATIENT
Start: 2024-06-25

## 2024-06-25 RX ORDER — DAPAGLIFLOZIN 10 MG/1
10 TABLET, FILM COATED ORAL DAILY
Qty: 90 TABLET | Refills: 1 | Status: SHIPPED | OUTPATIENT
Start: 2024-06-25

## 2024-06-25 RX ORDER — DOCUSATE SODIUM 100 MG/1
100 CAPSULE, LIQUID FILLED ORAL 2 TIMES DAILY
Qty: 60 CAPSULE | Refills: 1 | Status: SHIPPED | OUTPATIENT
Start: 2024-06-25

## 2024-06-25 RX ORDER — DULAGLUTIDE 4.5 MG/.5ML
4.5 INJECTION, SOLUTION SUBCUTANEOUS
Qty: 6 ML | Refills: 1 | Status: SHIPPED | OUTPATIENT
Start: 2024-06-25

## 2024-07-10 ENCOUNTER — APPOINTMENT (OUTPATIENT)
Dept: OPHTHALMOLOGY | Facility: CLINIC | Age: 64
End: 2024-07-10
Payer: COMMERCIAL

## 2024-07-15 ENCOUNTER — HOSPITAL ENCOUNTER (OUTPATIENT)
Dept: RADIOLOGY | Facility: CLINIC | Age: 64
Discharge: HOME | End: 2024-07-15
Payer: COMMERCIAL

## 2024-07-15 DIAGNOSIS — C64.1 MALIGNANT NEOPLASM OF RIGHT KIDNEY, EXCEPT RENAL PELVIS (MULTI): ICD-10-CM

## 2024-07-15 PROCEDURE — 2550000001 HC RX 255 CONTRASTS: Performed by: HOSPITALIST

## 2024-07-15 PROCEDURE — 74177 CT ABD & PELVIS W/CONTRAST: CPT

## 2024-07-15 PROCEDURE — 74177 CT ABD & PELVIS W/CONTRAST: CPT | Performed by: STUDENT IN AN ORGANIZED HEALTH CARE EDUCATION/TRAINING PROGRAM

## 2024-07-15 PROCEDURE — 71260 CT THORAX DX C+: CPT | Performed by: STUDENT IN AN ORGANIZED HEALTH CARE EDUCATION/TRAINING PROGRAM

## 2024-07-19 ENCOUNTER — OFFICE VISIT (OUTPATIENT)
Dept: HEMATOLOGY/ONCOLOGY | Facility: CLINIC | Age: 64
End: 2024-07-19
Payer: COMMERCIAL

## 2024-07-19 VITALS
WEIGHT: 245.26 LBS | OXYGEN SATURATION: 98 % | HEIGHT: 68 IN | SYSTOLIC BLOOD PRESSURE: 120 MMHG | HEART RATE: 92 BPM | DIASTOLIC BLOOD PRESSURE: 72 MMHG | BODY MASS INDEX: 37.17 KG/M2 | TEMPERATURE: 97.2 F

## 2024-07-19 DIAGNOSIS — C64.1 RENAL CELL CARCINOMA, RIGHT (MULTI): Primary | ICD-10-CM

## 2024-07-19 PROCEDURE — 3078F DIAST BP <80 MM HG: CPT | Performed by: HOSPITALIST

## 2024-07-19 PROCEDURE — 99214 OFFICE O/P EST MOD 30 MIN: CPT | Performed by: HOSPITALIST

## 2024-07-19 PROCEDURE — 1159F MED LIST DOCD IN RCRD: CPT | Performed by: HOSPITALIST

## 2024-07-19 PROCEDURE — 3008F BODY MASS INDEX DOCD: CPT | Performed by: HOSPITALIST

## 2024-07-19 PROCEDURE — 1126F AMNT PAIN NOTED NONE PRSNT: CPT | Performed by: HOSPITALIST

## 2024-07-19 PROCEDURE — 3044F HG A1C LEVEL LT 7.0%: CPT | Performed by: HOSPITALIST

## 2024-07-19 PROCEDURE — 4010F ACE/ARB THERAPY RXD/TAKEN: CPT | Performed by: HOSPITALIST

## 2024-07-19 PROCEDURE — 3074F SYST BP LT 130 MM HG: CPT | Performed by: HOSPITALIST

## 2024-07-19 ASSESSMENT — COLUMBIA-SUICIDE SEVERITY RATING SCALE - C-SSRS
2. HAVE YOU ACTUALLY HAD ANY THOUGHTS OF KILLING YOURSELF?: NO
1. IN THE PAST MONTH, HAVE YOU WISHED YOU WERE DEAD OR WISHED YOU COULD GO TO SLEEP AND NOT WAKE UP?: NO
6. HAVE YOU EVER DONE ANYTHING, STARTED TO DO ANYTHING, OR PREPARED TO DO ANYTHING TO END YOUR LIFE?: NO

## 2024-07-19 ASSESSMENT — PAIN SCALES - GENERAL: PAINLEVEL: 0-NO PAIN

## 2024-07-19 NOTE — PROGRESS NOTES
Patient here with her son for follow up post CT with Dr. Paniagua; seen for renal cancer.    Pt. Is currently on Surveillance.    Reconciled and reviewed medication and allergy list with patient.    Per patient she is doing well at this time.    Per MD, patient to return in 1 year having labs completed prior to follow up.    Reminded patient to check My Chart for any updates to scheduling and to review medication list against what  has at home.  Also reviewed patient can obtain lab results on My Chart which will be reviewed at follow up visits.  Patient does not currently have MyChart, but son states he will set her up for this on her phone. New text sent to patient's phone.    No barriers to education noted, pt. Agreed to plan and verbalized understanding using teach back method

## 2024-07-19 NOTE — PROGRESS NOTES
"Patient ID: Kelsey Rai is a 64 y.o. female.  Referring Physician: No referring provider defined for this encounter.  Primary Care Provider: Ora Ramirez DO    CANCER HISTORY:   Treatment History: Transfer of Care from Dr. Gudino      4/19 R renal mass, small pulm nodules, abscess  5/19 R lap nephrectomy - pT1bNx     On surveillance     History of Present Illness:      ID Statement:    KELSEY RAI is a 63 year old Female        Interval History:    doing well without complaints      No family history on file.  Oncology History    No history exists.       Kelsey Rai  reports that she quit smoking about 6 years ago. Her smoking use included cigarettes. She started smoking about 46 years ago. She has a 20 pack-year smoking history. She has never used smokeless tobacco.  She  reports no history of alcohol use.  She  reports current drug use. Drug: Marijuana.    ROS:  no ha, visual symptoms, hearing loss  no sob, chest pain, palp  ROS o/w non contributory, please see HPI    Objective   BSA: There is no height or weight on file to calculate BSA.  There were no vitals taken for this visit.    PHYSICAL EXAM:  NAD, awake/alert  HEENT, NCAT, OP clear, no oral lesions  CTA bilat  RRR no mgr  Abd soft/nt/nd+bs  No c/c/e/ttp  Motor/sensory intact, CN 2-12 intact    LABS:  Lab Results   Component Value Date    WBC 8.3 11/22/2022    HGB 12.7 11/22/2022    HCT 37.9 11/22/2022    MCV 92 11/22/2022     11/22/2022     Lab Results   Component Value Date    GLUCOSE 100 (H) 06/25/2024    CALCIUM 9.1 06/25/2024     06/25/2024    K 4.1 06/25/2024    CO2 30 06/25/2024     06/25/2024    BUN 19 06/25/2024    CREATININE 1.02 06/25/2024     Lab Results   Component Value Date    ALT 13 06/25/2024    AST 15 06/25/2024    ALKPHOS 59 06/25/2024    BILITOT 0.5 06/25/2024     Lab Results   Component Value Date    TSH 0.70 04/23/2022     No results found for: \"IRON\", \"TIBC\", \"FERRITIN\"  No results found for: " "\"PSA\"  No results found for: \"HCGQUANT\", \"AFPTM\", \"LDH\", \"MTESTO\"    IMAGING:  CT chest abdomen pelvis w IV contrast    Result Date: 7/15/2024  Interpreted By:  Rasheed White, STUDY: CT CHEST ABDOMEN PELVIS W IV CONTRAST;  7/15/2024 10:04 am   INDICATION: Signs/Symptoms: rencal cancer staging  C64.1: Renal cell carcinoma of right kidney.   COMPARISON: Abdomen and pelvic CT scan dated 07/10/2023. Chest CT scan dated 12/01/2020.           CHEST: 1. No intrathoracic metastatic disease. 2. Stable to slightly more conspicuous nonspecific bilateral lower lobe basal segment prominent interstitial airspace opacities and fibrotic changes could be related to chronic changes. 3. Severe coronary artery calcifications. Trace pericardial effusion.   ABDOMEN-PELVIS: 1. Status post right radical nephrectomy with no definite locoregional recurrence or metastatic disease in the abdomen or pelvis.       7/18/23 Mammo - BIRADS 1      DEXA: wnl    Assessment/Plan    62 yo woman 4/19 R renal mass, small pulm nodules, abscess  5/19 R lap nephrectomy - pT1bNx     On surveillance     7/23 CT a/p neg     Surveillance plan:   CT c/a/p in 1 yr and then consider no further surveillance after 5 yrs per NCCN guidelines and stage 1 RCC - can follow up with PMD and as needed with oncology or with survivorship thereafter  Until we have a survivorship program I will follow up in 1 year    I spent 30 minutes in evaluation and treatment of this patient during this visit      Anupam Paniagua MD                         "

## 2024-07-25 ENCOUNTER — APPOINTMENT (OUTPATIENT)
Dept: PRIMARY CARE | Facility: CLINIC | Age: 64
End: 2024-07-25
Payer: COMMERCIAL

## 2024-07-25 VITALS — SYSTOLIC BLOOD PRESSURE: 103 MMHG | DIASTOLIC BLOOD PRESSURE: 70 MMHG | WEIGHT: 246 LBS | BODY MASS INDEX: 37.54 KG/M2

## 2024-07-25 DIAGNOSIS — Z12.31 ENCOUNTER FOR SCREENING MAMMOGRAM FOR MALIGNANT NEOPLASM OF BREAST: ICD-10-CM

## 2024-07-25 DIAGNOSIS — E78.5 HYPERLIPIDEMIA, UNSPECIFIED HYPERLIPIDEMIA TYPE: ICD-10-CM

## 2024-07-25 DIAGNOSIS — Z12.11 COLON CANCER SCREENING: Primary | ICD-10-CM

## 2024-07-25 DIAGNOSIS — E11.9 TYPE 2 DIABETES MELLITUS WITHOUT COMPLICATION, WITH LONG-TERM CURRENT USE OF INSULIN (MULTI): ICD-10-CM

## 2024-07-25 DIAGNOSIS — Z79.4 TYPE 2 DIABETES MELLITUS WITHOUT COMPLICATION, WITH LONG-TERM CURRENT USE OF INSULIN (MULTI): ICD-10-CM

## 2024-07-25 DIAGNOSIS — I10 PRIMARY HYPERTENSION: ICD-10-CM

## 2024-07-25 PROCEDURE — 4010F ACE/ARB THERAPY RXD/TAKEN: CPT | Performed by: INTERNAL MEDICINE

## 2024-07-25 PROCEDURE — 3074F SYST BP LT 130 MM HG: CPT | Performed by: INTERNAL MEDICINE

## 2024-07-25 PROCEDURE — 3044F HG A1C LEVEL LT 7.0%: CPT | Performed by: INTERNAL MEDICINE

## 2024-07-25 PROCEDURE — 1036F TOBACCO NON-USER: CPT | Performed by: INTERNAL MEDICINE

## 2024-07-25 PROCEDURE — 99214 OFFICE O/P EST MOD 30 MIN: CPT | Performed by: INTERNAL MEDICINE

## 2024-07-25 PROCEDURE — 3078F DIAST BP <80 MM HG: CPT | Performed by: INTERNAL MEDICINE

## 2024-07-25 PROCEDURE — G2211 COMPLEX E/M VISIT ADD ON: HCPCS | Performed by: INTERNAL MEDICINE

## 2024-07-25 RX ORDER — BLOOD-GLUCOSE METER
EACH MISCELLANEOUS
Qty: 100 STRIP | Refills: 6 | Status: SHIPPED | OUTPATIENT
Start: 2024-07-25

## 2024-07-25 NOTE — PROGRESS NOTES
Primary care office Note      Name: Kelsey Rai, Age: 64 y.o., Gender: female, MRN: [id number]   Pharmacy:   [facility] - [place], [place] - [address] [facility]  [address] [facility]  [place] [address]  Phone: [phone] Fax: [phone]     [facility]  [address], [address]  [place] [address]  Phone: [phone] Fax: [phone]     PCP: Ora Ramirez        Subjective:   Chief Complaint   Patient presents with    Follow-up        Kelsey Rai is a 64 y.o. female who presented to the clinic today for follow up     HPI:   Kelsey Rai is a 64 y.o. female     DM2  -feeling well, lost weight, BG controlled  -using trulicity  -eating small meals throughout the day    HTN  -BP well controlled    Lost her daughter to cancer recently. Feeling well. Grieving.      The patient denies headaches, lightheadedness, changes in speech/vision, photophobia, dysphagia, diaphoresis, Chest pain, dyspnea, Abdominal pain, recent falls or trauma, and changes in bowel/urinary habits.     ROS:   12 points review of system is negative excepted as stated above in the HPI.    Medical History      PMH:    has a past medical history of Diabetes mellitus (Multi), Hypertension, Pelvic and perineal pain (2015), Personal history of nicotine dependence (2018), Personal history of other diseases of the female genital tract (2016), and Residual hemorrhoidal skin tags (2014).   Allergies:   Allergies   Allergen Reactions    Banana Hives and Itching    Latex Hives    Penicillins Itching     tolerated ceftriaxone 3/16/22. See allergy assessment from 3/18/2022      Surgical Hx:   Past Surgical History:   Procedure Laterality Date     SECTION, CLASSIC  2015     Section    CHOLECYSTECTOMY  2015    Cholecystectomy    OTHER SURGICAL HISTORY  2019    Nephrectomy      Social HX:   Social History     Tobacco Use    Smoking status: Former     Current packs/day: 0.00     Average  packs/day: 0.5 packs/day for 40.0 years (20.0 ttl pk-yrs)     Types: Cigarettes     Start date:      Quit date: 2018     Years since quittin.5    Smokeless tobacco: Never   Substance Use Topics    Alcohol use: Never    Drug use: Yes     Types: Marijuana     Comment: occassionally        MEDS:   Current Outpatient Medications   Medication Instructions    aspirin 81 mg, oral, Daily    blood sugar diagnostic (OneTouch Verio test strips) strip Test blood sugar 1-2 times daily.    blood-glucose meter (OneTouch Verio Meter) misc 1 each, miscellaneous, 2 times daily    cholecalciferol (VITAMIN D-3) 50 mcg, oral, Daily    clindamycin (Cleocin T) 1 % lotion apply to affected areas under the breasts, groin area and buttocks twice daily    dapagliflozin propanediol (FARXIGA) 10 mg, oral, Daily    docusate sodium (COLACE) 100 mg, oral, 2 times daily    hydroCHLOROthiazide (HYDRODIURIL) 25 mg, oral, Daily    lancets (OneTouch Delica Plus Lancet) 33 gauge misc TEST BLOOD SUGAR TWO TIMES A DAY    lisinopril 10 mg, oral, Daily    rosuvastatin (CRESTOR) 40 mg, oral, Daily    Trulicity 4.5 mg, subcutaneous, Once Weekly        Objective Data     Objective:   Visit Vitals  /70        Physical Examination:   GE; sitting comfortably in a chair, in NAD  HEENT; AT/NC, no conjunctival pallor, anicteric sclera  Neck; Supple neck, no LAD/JVD  Chest; CTAbl, no adventitious sounds  CVS; s1 and s2 only no MGR, RRR  Ext; no cyanosis/clubbing/edema, pulses intact  Neuro; Aox3  Psych; normal mood     Last Labs:   CBC:   WBC   Date Value Ref Range Status   2022 8.3 4.4 - 11.3 x10E9/L Final   2022 9.7 4.4 - 11.3 x10E9/L Final   2022 9.1 4.4 - 11.3 x10E9/L Final     Hemoglobin   Date Value Ref Range Status   2022 12.7 12.0 - 16.0 g/dL Final   2022 12.7 12.0 - 16.0 g/dL Final   2022 10.7 (L) 12.0 - 16.0 g/dL Final     MCV   Date Value Ref Range Status   2022 92 80 - 100 fL Final   2022 90 80  - 100 fL Final   03/20/2022 87 80 - 100 fL Final     Platelets   Date Value Ref Range Status   11/22/2022 273 150 - 450 x10E9/L Final   07/27/2022 284 150 - 450 x10E9/L Final   03/20/2022 254 150 - 450 x10E9/L Final      CMP:   Sodium   Date Value Ref Range Status   06/25/2024 140 136 - 145 mmol/L Final   09/01/2023 140 136 - 145 mmol/L Final   11/22/2022 138 136 - 145 mmol/L Final     Potassium   Date Value Ref Range Status   06/25/2024 4.1 3.5 - 5.3 mmol/L Final   09/01/2023 4.1 3.5 - 5.3 mmol/L Final   11/22/2022 4.1 3.5 - 5.3 mmol/L Final     Chloride   Date Value Ref Range Status   06/25/2024 103 98 - 107 mmol/L Final   09/01/2023 102 98 - 107 mmol/L Final   11/22/2022 105 98 - 107 mmol/L Final     Urea Nitrogen   Date Value Ref Range Status   06/25/2024 19 6 - 23 mg/dL Final   09/01/2023 17 6 - 23 mg/dL Final   07/06/2023 25 (H) 6 - 23 mg/dL Final     Creatinine   Date Value Ref Range Status   06/25/2024 1.02 0.50 - 1.05 mg/dL Final   09/01/2023 1.24 (H) 0.50 - 1.05 mg/dL Final   07/06/2023 1.29 (H) 0.50 - 1.05 mg/dL Final     eGFR   Date Value Ref Range Status   06/25/2024 61 >60 mL/min/1.73m*2 Final     Comment:     Calculations of estimated GFR are performed using the 2021 CKD-EPI Study Refit equation without the race variable for the IDMS-Traceable creatinine methods.  https://jasn.asnjournals.org/content/early/2021/09/22/ASN.7008478725      A1c:   Hemoglobin A1C   Date Value Ref Range Status   06/25/2024 6.3 (H) see below % Final   09/01/2023 7.9 (A) % Final     Comment:          Diagnosis of Diabetes-Adults   Non-Diabetic: < or = 5.6%   Increased risk for developing diabetes: 5.7-6.4%   Diagnostic of diabetes: > or = 6.5%  .       Monitoring of Diabetes                Age (y)     Therapeutic Goal (%)   Adults:          >18           <7.0   Pediatrics:    13-18           <7.5                   7-12           <8.0                   0- 6            7.5-8.5   American Diabetes Association. Diabetes Care  33(S1), Jan 2010.   11/22/2022 8.2 (A) % Final     Comment:          Diagnosis of Diabetes-Adults   Non-Diabetic: < or = 5.6%   Increased risk for developing diabetes: 5.7-6.4%   Diagnostic of diabetes: > or = 6.5%  .       Monitoring of Diabetes                Age (y)     Therapeutic Goal (%)   Adults:          >18           <7.0   Pediatrics:    13-18           <7.5                   7-12           <8.0                   0- 6            7.5-8.5   American Diabetes Association. Diabetes Care 33(S1), Jan 2010.          Other labs;   Vit D:   Vitamin D, 25-Hydroxy   Date Value Ref Range Status   04/23/2022 55 ng/mL Final     Comment:     .  DEFICIENCY:         < 20   NG/ML  INSUFFICIENCY:      20-29  NG/ML  SUFFICIENCY:         NG/ML    THIS ASSAY ACCURATELY QUANTIFIES THE SUM OF  VITAMIN D3, 25-HYDROXY AND VIT D2,25-HYDROXY.     08/20/2021 40 ng/mL Final     Comment:     .  DEFICIENCY:         < 20   NG/ML  INSUFFICIENCY:      20-29  NG/ML  SUFFICIENCY:         NG/ML    THIS ASSAY ACCURATELY QUANTIFIES THE SUM OF  VITAMIN D3, 25-HYDROXY AND VIT D2,25-HYDROXY.     06/16/2020 48 ng/mL Final     Comment:     .  DEFICIENCY:         < 20   NG/ML  INSUFFICIENCY:      20-29  NG/ML  SUFFICIENCY:         NG/ML    THIS ASSAY ACCURATELY QUANTIFIES THE SUM OF  VITAMIN D3, 25-HYDROXY AND VIT D2,25-HYDROXY.        TSH:  TSH   Date Value Ref Range Status   04/23/2022 0.70 0.44 - 3.98 mIU/L Final     Comment:      TSH testing is performed using different testing    methodology at [facility] than at other    system hospitals. Direct result comparisons should    only be made within the same method.     08/20/2021 0.79 0.44 - 3.98 mIU/L Final     Comment:      TSH testing is performed using different testing    methodology at [facility] than at other    system hospitals. Direct result comparisons should    only be made within the same method.     03/26/2019 0.88 0.44 - 3.98 mIU/L Final      Comment:      TSH testing is performed using different testing    methodology at [facility] than at other    [facility] hospitals. Direct result comparisons should    only be made within the same method.          Assessment and Plan     Problem List Items Addressed This Visit       Diabetes mellitus (Multi)    Relevant Medications    blood sugar diagnostic (OneTouch Verio test strips) strip    Hyperlipidemia     Cont current regimen         Hypertension     Well controlled, cont current regimen          Other Visit Diagnoses       Colon cancer screening    -  Primary    Relevant Orders    Colonoscopy Screening; Average Risk Patient    Encounter for screening mammogram for malignant neoplasm of breast        Relevant Orders    BI mammo bilateral screening tomosynthesis            rOa Ramirez DO

## 2024-08-06 DIAGNOSIS — Z12.11 SPECIAL SCREENING FOR MALIGNANT NEOPLASMS, COLON: ICD-10-CM

## 2024-08-06 DIAGNOSIS — Z12.11 COLON CANCER SCREENING: ICD-10-CM

## 2024-08-06 RX ORDER — POLYETHYLENE GLYCOL 3350, SODIUM SULFATE ANHYDROUS, SODIUM BICARBONATE, SODIUM CHLORIDE, POTASSIUM CHLORIDE 236; 22.74; 6.74; 5.86; 2.97 G/4L; G/4L; G/4L; G/4L; G/4L
4 POWDER, FOR SOLUTION ORAL ONCE
Qty: 4000 ML | Refills: 0 | Status: SHIPPED | OUTPATIENT
Start: 2024-08-06 | End: 2024-08-06

## 2024-08-06 RX ORDER — POLYETHYLENE GLYCOL 3350, SODIUM CHLORIDE, SODIUM BICARBONATE, POTASSIUM CHLORIDE 420; 11.2; 5.72; 1.48 G/4L; G/4L; G/4L; G/4L
POWDER, FOR SOLUTION ORAL
Qty: 4000 ML | Refills: 0 | Status: CANCELLED | OUTPATIENT
Start: 2024-08-06

## 2024-08-09 ENCOUNTER — OFFICE VISIT (OUTPATIENT)
Dept: SURGERY | Facility: CLINIC | Age: 64
End: 2024-08-09
Payer: COMMERCIAL

## 2024-08-09 VITALS
HEART RATE: 86 BPM | TEMPERATURE: 97 F | DIASTOLIC BLOOD PRESSURE: 66 MMHG | SYSTOLIC BLOOD PRESSURE: 97 MMHG | BODY MASS INDEX: 37.39 KG/M2 | WEIGHT: 245 LBS

## 2024-08-09 DIAGNOSIS — L73.2 HIDRADENITIS SUPPURATIVA: Primary | ICD-10-CM

## 2024-08-09 PROCEDURE — 3044F HG A1C LEVEL LT 7.0%: CPT | Performed by: SURGERY

## 2024-08-09 PROCEDURE — 99213 OFFICE O/P EST LOW 20 MIN: CPT | Performed by: SURGERY

## 2024-08-09 PROCEDURE — 4010F ACE/ARB THERAPY RXD/TAKEN: CPT | Performed by: SURGERY

## 2024-08-09 PROCEDURE — 3074F SYST BP LT 130 MM HG: CPT | Performed by: SURGERY

## 2024-08-09 PROCEDURE — 1125F AMNT PAIN NOTED PAIN PRSNT: CPT | Performed by: SURGERY

## 2024-08-09 PROCEDURE — 3078F DIAST BP <80 MM HG: CPT | Performed by: SURGERY

## 2024-08-09 ASSESSMENT — PAIN SCALES - GENERAL: PAINLEVEL: 2

## 2024-08-09 ASSESSMENT — ENCOUNTER SYMPTOMS: DEPRESSION: 0

## 2024-08-09 NOTE — PROGRESS NOTES
64-year-old female with history of obesity and hidradenitis suppurativa.  I have seen her in the past for wide excisional biopsy of hidradenitis involving the right and left buttock in the past.    Comes now for follow-up.  In general she is doing well but is noted some pain on the left inner thigh/buttock.    On exam her incisions are healing very nicely.  There is a small pustule in the left inner upper thigh.  No obvious abscess that would require drainage at this point    She was previously prescribed a low-dose minocycline.  Recommend sitz bath's, minocycline double dose.    I did refer her to dermatology for consideration of a biologic    Follow-up with me in 6 months or as needed

## 2024-08-09 NOTE — LETTER
August 9, 2024     Ora Ramirez DO  [address]  [facility] [address]    Patient: Kelsey Rai   YOB: 1960   Date of Visit: 8/9/2024       Dear Dr. Ora Ramirez DO:    Thank you for referring Kelsey Rai to me for evaluation. Below are my notes for this consultation.  If you have questions, please do not hesitate to call me. I look forward to following your patient along with you.       Sincerely,     Nghia Lubin MD      CC: No Recipients  ______________________________________________________________________________________    64-year-old female with history of obesity and hidradenitis suppurativa.  I have seen her in the past for wide excisional biopsy of hidradenitis involving the right and left buttock in the past.    Comes now for follow-up.  In general she is doing well but is noted some pain on the left inner thigh/buttock.    On exam her incisions are healing very nicely.  There is a small pustule in the left inner upper thigh.  No obvious abscess that would require drainage at this point    She was previously prescribed a low-dose minocycline.  Recommend sitz bath's, minocycline double dose.    I did refer her to dermatology for consideration of a biologic    Follow-up with me in 6 months or as needed

## 2024-08-27 ENCOUNTER — HOSPITAL ENCOUNTER (OUTPATIENT)
Dept: RADIOLOGY | Facility: CLINIC | Age: 64
Discharge: HOME | End: 2024-08-27
Payer: COMMERCIAL

## 2024-08-27 VITALS — BODY MASS INDEX: 37.09 KG/M2 | HEIGHT: 68 IN | WEIGHT: 244.71 LBS

## 2024-08-27 DIAGNOSIS — Z12.31 ENCOUNTER FOR SCREENING MAMMOGRAM FOR MALIGNANT NEOPLASM OF BREAST: ICD-10-CM

## 2024-08-27 PROCEDURE — 77067 SCR MAMMO BI INCL CAD: CPT | Performed by: RADIOLOGY

## 2024-08-27 PROCEDURE — 77063 BREAST TOMOSYNTHESIS BI: CPT | Performed by: RADIOLOGY

## 2024-08-27 PROCEDURE — 77067 SCR MAMMO BI INCL CAD: CPT

## 2024-08-30 ENCOUNTER — TELEPHONE (OUTPATIENT)
Dept: PRIMARY CARE | Facility: CLINIC | Age: 64
End: 2024-08-30
Payer: COMMERCIAL

## 2024-08-30 NOTE — PROGRESS NOTES
Date: 08/30/24    Dear Kelsey Rai    Our records indicate that you are due for your DIABETIC EYE EXAM.     At your earliest convenience please call [phone] to schedule your appointment or test. If you need any assistance with scheduling an appointment please contact your Primary Care Physician.      If you have already scheduled an appointment or have completed testing - please disregard this letter.    Note: If you complete your DIABETIC EYE EXAM with an Opthalmologist or another specialist - please make your PCP and our office aware.               Sincerely,    Radha Villarreal MA

## 2024-09-16 ENCOUNTER — APPOINTMENT (OUTPATIENT)
Dept: GASTROENTEROLOGY | Facility: EXTERNAL LOCATION | Age: 64
End: 2024-09-16
Payer: COMMERCIAL

## 2024-09-16 ENCOUNTER — LAB REQUISITION (OUTPATIENT)
Dept: LAB | Facility: HOSPITAL | Age: 64
End: 2024-09-16
Payer: COMMERCIAL

## 2024-09-16 DIAGNOSIS — Z12.11 COLON CANCER SCREENING: ICD-10-CM

## 2024-09-16 DIAGNOSIS — D12.5 BENIGN NEOPLASM OF SIGMOID COLON: Primary | ICD-10-CM

## 2024-09-16 DIAGNOSIS — D12.3 BENIGN NEOPLASM OF TRANSVERSE COLON: ICD-10-CM

## 2024-09-16 PROCEDURE — 0753T DGTZ GLS MCRSCP SLD LEVEL IV: CPT

## 2024-09-16 PROCEDURE — 88305 TISSUE EXAM BY PATHOLOGIST: CPT

## 2024-09-16 PROCEDURE — 3044F HG A1C LEVEL LT 7.0%: CPT | Performed by: INTERNAL MEDICINE

## 2024-09-16 PROCEDURE — 4010F ACE/ARB THERAPY RXD/TAKEN: CPT | Performed by: INTERNAL MEDICINE

## 2024-09-16 PROCEDURE — 88305 TISSUE EXAM BY PATHOLOGIST: CPT | Performed by: SPECIALIST

## 2024-09-16 PROCEDURE — 45380 COLONOSCOPY AND BIOPSY: CPT | Performed by: INTERNAL MEDICINE

## 2024-09-16 PROCEDURE — 45385 COLONOSCOPY W/LESION REMOVAL: CPT | Performed by: INTERNAL MEDICINE

## 2024-09-20 ENCOUNTER — APPOINTMENT (OUTPATIENT)
Dept: PHARMACY | Facility: HOSPITAL | Age: 64
End: 2024-09-20
Payer: COMMERCIAL

## 2024-09-24 NOTE — RESULT ENCOUNTER NOTE
Dear Ms. Rai:    During your colonoscopy, polyp(s) were seen, removed, and sent to pathology for evaluation. These types of polyps are benign and are pre-cancerous polyps. This does NOT mean you have colon cancer, rather these lesions were removed to prevent them from developing into cancer. Since you had these types of lesions, you are at risk for developing similar recurrent polyps and have a slightly higher risk of developing colon cancer as a result. Studies suggest that by continued surveillance and removal of any subsequent polyps, we can help prevent the development of colon cancer.    At this time, we recommend a repeat colonoscopy in three (3) year(s).    We hope this information is helpful to you. Your health and the quality of care you receive are important to us. Please call our office should you have any questions or concerns.    Sincerely,    Teddy Corbett MD MS

## 2024-11-12 ENCOUNTER — APPOINTMENT (OUTPATIENT)
Dept: OPHTHALMOLOGY | Facility: CLINIC | Age: 64
End: 2024-11-12
Payer: COMMERCIAL

## 2024-11-14 ENCOUNTER — APPOINTMENT (OUTPATIENT)
Dept: DERMATOLOGY | Facility: CLINIC | Age: 64
End: 2024-11-14
Payer: COMMERCIAL

## 2025-01-10 DIAGNOSIS — E11.9 TYPE 2 DIABETES MELLITUS WITHOUT COMPLICATION, WITH LONG-TERM CURRENT USE OF INSULIN (MULTI): ICD-10-CM

## 2025-01-10 DIAGNOSIS — Z79.4 TYPE 2 DIABETES MELLITUS WITHOUT COMPLICATION, WITH LONG-TERM CURRENT USE OF INSULIN (MULTI): ICD-10-CM

## 2025-01-13 RX ORDER — DULAGLUTIDE 4.5 MG/.5ML
INJECTION, SOLUTION SUBCUTANEOUS
Qty: 6 ML | Refills: 0 | Status: SHIPPED | OUTPATIENT
Start: 2025-01-13

## 2025-01-23 ENCOUNTER — APPOINTMENT (OUTPATIENT)
Dept: PRIMARY CARE | Facility: CLINIC | Age: 65
End: 2025-01-23
Payer: COMMERCIAL

## 2025-01-29 ENCOUNTER — APPOINTMENT (OUTPATIENT)
Dept: PRIMARY CARE | Facility: CLINIC | Age: 65
End: 2025-01-29
Payer: COMMERCIAL

## 2025-01-29 VITALS
SYSTOLIC BLOOD PRESSURE: 114 MMHG | WEIGHT: 257 LBS | BODY MASS INDEX: 38.95 KG/M2 | DIASTOLIC BLOOD PRESSURE: 68 MMHG | HEIGHT: 68 IN

## 2025-01-29 DIAGNOSIS — L30.4 INTERTRIGO: ICD-10-CM

## 2025-01-29 DIAGNOSIS — E55.9 VITAMIN D DEFICIENCY: ICD-10-CM

## 2025-01-29 DIAGNOSIS — I10 PRIMARY HYPERTENSION: ICD-10-CM

## 2025-01-29 DIAGNOSIS — E78.5 HYPERLIPIDEMIA, UNSPECIFIED HYPERLIPIDEMIA TYPE: ICD-10-CM

## 2025-01-29 DIAGNOSIS — Z00.00 ROUTINE GENERAL MEDICAL EXAMINATION AT HEALTH CARE FACILITY: Primary | ICD-10-CM

## 2025-01-29 DIAGNOSIS — I10 HYPERTENSION, UNSPECIFIED TYPE: ICD-10-CM

## 2025-01-29 DIAGNOSIS — Z87.891 SMOKING HISTORY: ICD-10-CM

## 2025-01-29 DIAGNOSIS — C64.1 CLEAR CELL CARCINOMA OF RIGHT KIDNEY (MULTI): ICD-10-CM

## 2025-01-29 DIAGNOSIS — E11.9 TYPE 2 DIABETES MELLITUS WITHOUT COMPLICATION, WITHOUT LONG-TERM CURRENT USE OF INSULIN (MULTI): ICD-10-CM

## 2025-01-29 DIAGNOSIS — Z13.6 SCREENING FOR HEART DISEASE: ICD-10-CM

## 2025-01-29 DIAGNOSIS — I25.10 CORONARY ARTERY DISEASE INVOLVING NATIVE HEART WITHOUT ANGINA PECTORIS, UNSPECIFIED VESSEL OR LESION TYPE: ICD-10-CM

## 2025-01-29 PROCEDURE — 99214 OFFICE O/P EST MOD 30 MIN: CPT | Performed by: INTERNAL MEDICINE

## 2025-01-29 PROCEDURE — 3078F DIAST BP <80 MM HG: CPT | Performed by: INTERNAL MEDICINE

## 2025-01-29 PROCEDURE — 1170F FXNL STATUS ASSESSED: CPT | Performed by: INTERNAL MEDICINE

## 2025-01-29 PROCEDURE — 1036F TOBACCO NON-USER: CPT | Performed by: INTERNAL MEDICINE

## 2025-01-29 PROCEDURE — 3074F SYST BP LT 130 MM HG: CPT | Performed by: INTERNAL MEDICINE

## 2025-01-29 PROCEDURE — 1159F MED LIST DOCD IN RCRD: CPT | Performed by: INTERNAL MEDICINE

## 2025-01-29 PROCEDURE — 3008F BODY MASS INDEX DOCD: CPT | Performed by: INTERNAL MEDICINE

## 2025-01-29 PROCEDURE — 1160F RVW MEDS BY RX/DR IN RCRD: CPT | Performed by: INTERNAL MEDICINE

## 2025-01-29 PROCEDURE — 4010F ACE/ARB THERAPY RXD/TAKEN: CPT | Performed by: INTERNAL MEDICINE

## 2025-01-29 PROCEDURE — G0439 PPPS, SUBSEQ VISIT: HCPCS | Performed by: INTERNAL MEDICINE

## 2025-01-29 RX ORDER — LISINOPRIL 10 MG/1
10 TABLET ORAL DAILY
Qty: 90 TABLET | Refills: 1 | Status: SHIPPED | OUTPATIENT
Start: 2025-01-29

## 2025-01-29 RX ORDER — CLOTRIMAZOLE AND BETAMETHASONE DIPROPIONATE 10; .64 MG/G; MG/G
1 CREAM TOPICAL 2 TIMES DAILY
Qty: 15 G | Refills: 1 | Status: SHIPPED | OUTPATIENT
Start: 2025-01-29 | End: 2025-03-30

## 2025-01-29 RX ORDER — ROSUVASTATIN CALCIUM 40 MG/1
40 TABLET, COATED ORAL DAILY
Qty: 90 TABLET | Refills: 1 | Status: SHIPPED | OUTPATIENT
Start: 2025-01-29 | End: 2025-07-28

## 2025-01-29 RX ORDER — FLUCONAZOLE 150 MG/1
TABLET ORAL
Qty: 2 TABLET | Refills: 0 | Status: SHIPPED | OUTPATIENT
Start: 2025-01-29

## 2025-01-29 RX ORDER — HYDROCHLOROTHIAZIDE 25 MG/1
25 TABLET ORAL DAILY
Qty: 90 TABLET | Refills: 1 | Status: SHIPPED | OUTPATIENT
Start: 2025-01-29

## 2025-01-29 ASSESSMENT — PATIENT HEALTH QUESTIONNAIRE - PHQ9
1. LITTLE INTEREST OR PLEASURE IN DOING THINGS: NOT AT ALL
2. FEELING DOWN, DEPRESSED OR HOPELESS: NOT AT ALL
SUM OF ALL RESPONSES TO PHQ9 QUESTIONS 1 AND 2: 0

## 2025-01-29 ASSESSMENT — ACTIVITIES OF DAILY LIVING (ADL)
MANAGING_FINANCES: INDEPENDENT
BATHING: INDEPENDENT
DRESSING: INDEPENDENT
DOING_HOUSEWORK: INDEPENDENT
TAKING_MEDICATION: INDEPENDENT
GROCERY_SHOPPING: INDEPENDENT

## 2025-01-29 NOTE — PROGRESS NOTES
Primary care office Note      Name: Kelsey Rai, Age: 64 y.o., Gender: female, MRN: [id number]   Pharmacy:   [facility] - [place], [place] - [address] [facility]  [address] [facility]  [place] [address]  Phone: [phone] Fax: [phone]     [facility]  [address] [place] [address], [address]  [place] [address]  Phone: [phone] Fax: [phone]     PCP: Ora Ramirez        Subjective:   Chief Complaint   Patient presents with    MCW        Kelsey Rai is a 64 y.o. female who presented to the clinic today for AWV and follow up     HPI:   Kelsey Rai is a 64 y.o. female  Pt is feeling well.   C/o some vaginal itching and irritation at times. Has been trying OTC methods without relief.   Has been struggling with feeling sad after her daughter passed away. Overall doing ok.     Has a follow up with derm in February for her HS, as well as general surgery again.    The patient denies headaches, lightheadedness, changes in speech/vision, photophobia, dysphagia, diaphoresis, Chest pain, dyspnea, Abdominal pain, recent falls or trauma, and changes in bowel/urinary habits.     ROS:   12 points review of system is negative excepted as stated above in the HPI.    Medical History      PMH:    has a past medical history of Diabetes mellitus (Multi), Hypertension, Pelvic and perineal pain (2015), Personal history of nicotine dependence (2018), Personal history of other diseases of the female genital tract (2016), and Residual hemorrhoidal skin tags (2014).   Allergies:   Allergies   Allergen Reactions    Banana Hives and Itching    Latex Hives    Penicillins Itching     tolerated ceftriaxone 3/16/22. See allergy assessment from 3/18/2022      Surgical Hx:   Past Surgical History:   Procedure Laterality Date     SECTION, CLASSIC  2015     Section    CHOLECYSTECTOMY  2015    Cholecystectomy    OTHER SURGICAL HISTORY  2019    Nephrectomy      Social HX:   Social  History     Tobacco Use    Smoking status: Former     Current packs/day: 0.00     Average packs/day: 0.5 packs/day for 40.0 years (20.0 ttl pk-yrs)     Types: Cigarettes     Start date:      Quit date: 2018     Years since quittin.0    Smokeless tobacco: Never   Substance Use Topics    Alcohol use: Never    Drug use: Yes     Types: Marijuana     Comment: occassionally        MEDS:   Current Outpatient Medications   Medication Instructions    aspirin 81 mg, oral, Daily    blood sugar diagnostic (OneTouch Verio test strips) strip Test blood sugar 1-2 times daily.    blood-glucose meter (OneTouch Verio Meter) misc 1 each, miscellaneous, 2 times daily    cholecalciferol (VITAMIN D-3) 50 mcg, oral, Daily    clindamycin (Cleocin T) 1 % lotion apply to affected areas under the breasts, groin area and buttocks twice daily    clotrimazole-betamethasone (Lotrisone) cream 1 Application, Topical, 2 times daily    dapagliflozin propanediol (FARXIGA) 10 mg, oral, Daily    docusate sodium (COLACE) 100 mg, oral, 2 times daily    fluconazole (Diflucan) 150 mg tablet Take 1 take once and repeat in 4 days if symptoms persist.    hydroCHLOROthiazide (HYDRODIURIL) 25 mg, oral, Daily    lancets (OneTouch Delica Plus Lancet) 33 gauge misc TEST BLOOD SUGAR TWO TIMES A DAY    lisinopril 10 mg, oral, Daily    rosuvastatin (CRESTOR) 40 mg, oral, Daily    Trulicity 4.5 mg/0.5 mL pen injector INJECT 4.5 MGS ONCE WEEKLY        Objective Data     Objective:   Visit Vitals  /68 (BP Location: Left arm)        Physical Examination:   GE; sitting comfortably in a chair, in NAD  HEENT; AT/NC, no conjunctival pallor, anicteric sclera  Neck; Supple neck, no LAD/JVD  Chest; CTAbl, no adventitious sounds  CVS; s1 and s2 only no MGR, RRR  Ext; no cyanosis/clubbing/edema, pulses intact  Neuro; Aox3  Psych; normal mood     Last Labs:   CBC:   WBC   Date Value Ref Range Status   2022 8.3 4.4 - 11.3 x10E9/L Final   2022 9.7 4.4 - 11.3  x10E9/L Final   03/20/2022 9.1 4.4 - 11.3 x10E9/L Final     Hemoglobin   Date Value Ref Range Status   11/22/2022 12.7 12.0 - 16.0 g/dL Final   07/27/2022 12.7 12.0 - 16.0 g/dL Final   03/20/2022 10.7 (L) 12.0 - 16.0 g/dL Final     MCV   Date Value Ref Range Status   11/22/2022 92 80 - 100 fL Final   07/27/2022 90 80 - 100 fL Final   03/20/2022 87 80 - 100 fL Final     Platelets   Date Value Ref Range Status   11/22/2022 273 150 - 450 x10E9/L Final   07/27/2022 284 150 - 450 x10E9/L Final   03/20/2022 254 150 - 450 x10E9/L Final      CMP:   Sodium   Date Value Ref Range Status   06/25/2024 140 136 - 145 mmol/L Final   09/01/2023 140 136 - 145 mmol/L Final   11/22/2022 138 136 - 145 mmol/L Final     Potassium   Date Value Ref Range Status   06/25/2024 4.1 3.5 - 5.3 mmol/L Final   09/01/2023 4.1 3.5 - 5.3 mmol/L Final   11/22/2022 4.1 3.5 - 5.3 mmol/L Final     Chloride   Date Value Ref Range Status   06/25/2024 103 98 - 107 mmol/L Final   09/01/2023 102 98 - 107 mmol/L Final   11/22/2022 105 98 - 107 mmol/L Final     Urea Nitrogen   Date Value Ref Range Status   06/25/2024 19 6 - 23 mg/dL Final   09/01/2023 17 6 - 23 mg/dL Final   07/06/2023 25 (H) 6 - 23 mg/dL Final     Creatinine   Date Value Ref Range Status   06/25/2024 1.02 0.50 - 1.05 mg/dL Final   09/01/2023 1.24 (H) 0.50 - 1.05 mg/dL Final   07/06/2023 1.29 (H) 0.50 - 1.05 mg/dL Final     eGFR   Date Value Ref Range Status   06/25/2024 61 >60 mL/min/1.73m*2 Final     Comment:     Calculations of estimated GFR are performed using the 2021 CKD-EPI Study Refit equation without the race variable for the IDMS-Traceable creatinine methods.  https://jasn.asnjournals.org/content/early/2021/09/22/ASN.2423590092      A1c:   Hemoglobin A1C   Date Value Ref Range Status   06/25/2024 6.3 (H) see below % Final   09/01/2023 7.9 (A) % Final     Comment:          Diagnosis of Diabetes-Adults   Non-Diabetic: < or = 5.6%   Increased risk for developing diabetes: 5.7-6.4%    Diagnostic of diabetes: > or = 6.5%  .       Monitoring of Diabetes                Age (y)     Therapeutic Goal (%)   Adults:          >18           <7.0   Pediatrics:    13-18           <7.5                   7-12           <8.0                   0- 6            7.5-8.5   American Diabetes Association. Diabetes Care 33(S1), Jan 2010.   11/22/2022 8.2 (A) % Final     Comment:          Diagnosis of Diabetes-Adults   Non-Diabetic: < or = 5.6%   Increased risk for developing diabetes: 5.7-6.4%   Diagnostic of diabetes: > or = 6.5%  .       Monitoring of Diabetes                Age (y)     Therapeutic Goal (%)   Adults:          >18           <7.0   Pediatrics:    13-18           <7.5                   7-12           <8.0                   0- 6            7.5-8.5   American Diabetes Association. Diabetes Care 33(S1), Jan 2010.          Other labs;   Vit D:   Vitamin D, 25-Hydroxy   Date Value Ref Range Status   04/23/2022 55 ng/mL Final     Comment:     .  DEFICIENCY:         < 20   NG/ML  INSUFFICIENCY:      20-29  NG/ML  SUFFICIENCY:         NG/ML    THIS ASSAY ACCURATELY QUANTIFIES THE SUM OF  VITAMIN D3, 25-HYDROXY AND VIT D2,25-HYDROXY.     08/20/2021 40 ng/mL Final     Comment:     .  DEFICIENCY:         < 20   NG/ML  INSUFFICIENCY:      20-29  NG/ML  SUFFICIENCY:         NG/ML    THIS ASSAY ACCURATELY QUANTIFIES THE SUM OF  VITAMIN D3, 25-HYDROXY AND VIT D2,25-HYDROXY.     06/16/2020 48 ng/mL Final     Comment:     .  DEFICIENCY:         < 20   NG/ML  INSUFFICIENCY:      20-29  NG/ML  SUFFICIENCY:         NG/ML    THIS ASSAY ACCURATELY QUANTIFIES THE SUM OF  VITAMIN D3, 25-HYDROXY AND VIT D2,25-HYDROXY.        TSH:  TSH   Date Value Ref Range Status   04/23/2022 0.70 0.44 - 3.98 mIU/L Final     Comment:      TSH testing is performed using different testing    methodology at [facility] than at other    [facility] hospitals. Direct result comparisons should    only be made within the  same method.     08/20/2021 0.79 0.44 - 3.98 mIU/L Final     Comment:      TSH testing is performed using different testing    methodology at [facility] than at other    [facility]. Direct result comparisons should    only be made within the same method.     03/26/2019 0.88 0.44 - 3.98 mIU/L Final     Comment:      TSH testing is performed using different testing    methodology at [facility] than at other    [facility]. Direct result comparisons should    only be made within the same method.          Assessment and Plan     Problem List Items Addressed This Visit       Diabetes mellitus (Multi)    Relevant Orders    Hemoglobin A1C    Albumin-Creatinine Ratio, Urine Random    Hyperlipidemia    Relevant Medications    rosuvastatin (Crestor) 40 mg tablet    Other Relevant Orders    Cholesterol, LDL Direct    Lipid Panel Non-Fasting    Referral to Cardiology    Hypertension    Relevant Medications    hydroCHLOROthiazide (HYDRODiuril) 25 mg tablet    lisinopril 10 mg tablet    Other Relevant Orders    CBC and Auto Differential    Comprehensive Metabolic Panel    TSH with reflex to Free T4 if abnormal    Referral to Cardiology    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)     Following closely with urology status post nephrectomy         Vitamin D deficiency    Relevant Orders    Vitamin D 25-Hydroxy,Total (for eval of Vitamin D levels)     Other Visit Diagnoses       Routine general medical examination at health care facility    -  Primary    Intertrigo        Relevant Medications    fluconazole (Diflucan) 150 mg tablet    clotrimazole-betamethasone (Lotrisone) cream    Smoking history        Screening for heart disease        Relevant Orders    Referral to Cardiology    Coronary artery disease involving native heart without angina pectoris, unspecified vessel or lesion type        Relevant Orders    Referral to Cardiology            Ora Ramirez, DO     Medicare Wellness  Billing Compliance Satisfied    *This is a visual tool to show completion of required items on the day of the visit. Green checks will only appear on the date of visit.    Review all medications by prescribing practitioner or clinical pharmacist (such as prescriptions, OTCs, herbal therapies and supplements) documented in the medical record    Past Medical, Surgical, and Family History reviewed and updated in chart    Tobacco Use Reviewed    Alcohol Use Reviewed    Illicit Drug Use Reviewed    PHQ2/9    Falls in Last Year Reviewed    Home Safety Risk Factors Reviewed    Cognitive Impairment Reviewed    Patient Self Assessment and Health Status    Current Diet Reviewed    Exercise Frequency    ADL - Hearing Impairment    ADL - Bathing    ADL - Dressing    ADL - Walks in Home    IADL - Managing Finances    IADL - Grocery Shopping    IADL - Taking Medications    IADL - Doing Housework

## 2025-01-30 LAB
25(OH)D3+25(OH)D2 SERPL-MCNC: 54 NG/ML (ref 30–100)
ALBUMIN SERPL-MCNC: 3.7 G/DL (ref 3.6–5.1)
ALBUMIN/CREAT UR: 3 MG/G CREAT
ALP SERPL-CCNC: 58 U/L (ref 37–153)
ALT SERPL-CCNC: 13 U/L (ref 6–29)
ANION GAP SERPL CALCULATED.4IONS-SCNC: 8 MMOL/L (CALC) (ref 7–17)
AST SERPL-CCNC: 15 U/L (ref 10–35)
BASOPHILS # BLD AUTO: 31 CELLS/UL (ref 0–200)
BASOPHILS NFR BLD AUTO: 0.4 %
BILIRUB SERPL-MCNC: 0.4 MG/DL (ref 0.2–1.2)
BUN SERPL-MCNC: 20 MG/DL (ref 7–25)
CALCIUM SERPL-MCNC: 8.7 MG/DL (ref 8.6–10.4)
CHLORIDE SERPL-SCNC: 105 MMOL/L (ref 98–110)
CO2 SERPL-SCNC: 26 MMOL/L (ref 20–32)
CREAT SERPL-MCNC: 1.11 MG/DL (ref 0.5–1.05)
CREAT UR-MCNC: 104 MG/DL (ref 20–275)
EGFRCR SERPLBLD CKD-EPI 2021: 55 ML/MIN/1.73M2
EOSINOPHIL # BLD AUTO: 270 CELLS/UL (ref 15–500)
EOSINOPHIL NFR BLD AUTO: 3.5 %
ERYTHROCYTE [DISTWIDTH] IN BLOOD BY AUTOMATED COUNT: 13.5 % (ref 11–15)
EST. AVERAGE GLUCOSE BLD GHB EST-MCNC: 137 MG/DL
EST. AVERAGE GLUCOSE BLD GHB EST-SCNC: 7.6 MMOL/L
GLUCOSE SERPL-MCNC: 81 MG/DL (ref 65–99)
HBA1C MFR BLD: 6.4 % OF TOTAL HGB
HCT VFR BLD AUTO: 40.4 % (ref 35–45)
HGB BLD-MCNC: 13.2 G/DL (ref 11.7–15.5)
LDLC SERPL DIRECT ASSAY-MCNC: 58 MG/DL
LYMPHOCYTES # BLD AUTO: 2695 CELLS/UL (ref 850–3900)
LYMPHOCYTES NFR BLD AUTO: 35 %
MCH RBC QN AUTO: 30.2 PG (ref 27–33)
MCHC RBC AUTO-ENTMCNC: 32.7 G/DL (ref 32–36)
MCV RBC AUTO: 92.4 FL (ref 80–100)
MICROALBUMIN UR-MCNC: 0.3 MG/DL
MONOCYTES # BLD AUTO: 701 CELLS/UL (ref 200–950)
MONOCYTES NFR BLD AUTO: 9.1 %
NEUTROPHILS # BLD AUTO: 4004 CELLS/UL (ref 1500–7800)
NEUTROPHILS NFR BLD AUTO: 52 %
PLATELET # BLD AUTO: 273 THOUSAND/UL (ref 140–400)
PMV BLD REES-ECKER: 11.3 FL (ref 7.5–12.5)
POTASSIUM SERPL-SCNC: 4.2 MMOL/L (ref 3.5–5.3)
PROT SERPL-MCNC: 7.2 G/DL (ref 6.1–8.1)
RBC # BLD AUTO: 4.37 MILLION/UL (ref 3.8–5.1)
SODIUM SERPL-SCNC: 139 MMOL/L (ref 135–146)
TSH SERPL-ACNC: 0.76 MIU/L (ref 0.4–4.5)
WBC # BLD AUTO: 7.7 THOUSAND/UL (ref 3.8–10.8)

## 2025-02-03 ENCOUNTER — OFFICE VISIT (OUTPATIENT)
Dept: SURGERY | Facility: CLINIC | Age: 65
End: 2025-02-03
Payer: COMMERCIAL

## 2025-02-03 VITALS — HEART RATE: 91 BPM | SYSTOLIC BLOOD PRESSURE: 120 MMHG | DIASTOLIC BLOOD PRESSURE: 78 MMHG

## 2025-02-03 DIAGNOSIS — L73.2 HIDRADENITIS SUPPURATIVA: Primary | ICD-10-CM

## 2025-02-03 PROCEDURE — 99213 OFFICE O/P EST LOW 20 MIN: CPT | Performed by: SURGERY

## 2025-02-03 PROCEDURE — 4010F ACE/ARB THERAPY RXD/TAKEN: CPT | Performed by: SURGERY

## 2025-02-03 PROCEDURE — 3074F SYST BP LT 130 MM HG: CPT | Performed by: SURGERY

## 2025-02-03 PROCEDURE — 1159F MED LIST DOCD IN RCRD: CPT | Performed by: SURGERY

## 2025-02-03 PROCEDURE — 1036F TOBACCO NON-USER: CPT | Performed by: SURGERY

## 2025-02-03 PROCEDURE — 1125F AMNT PAIN NOTED PAIN PRSNT: CPT | Performed by: SURGERY

## 2025-02-03 PROCEDURE — 3078F DIAST BP <80 MM HG: CPT | Performed by: SURGERY

## 2025-02-03 RX ORDER — DOXYCYCLINE 100 MG/1
100 CAPSULE ORAL 2 TIMES DAILY
Qty: 20 CAPSULE | Refills: 0 | Status: SHIPPED | OUTPATIENT
Start: 2025-02-03 | End: 2025-02-13

## 2025-02-03 ASSESSMENT — ENCOUNTER SYMPTOMS
OCCASIONAL FEELINGS OF UNSTEADINESS: 0
LOSS OF SENSATION IN FEET: 0
DEPRESSION: 0

## 2025-02-03 ASSESSMENT — PAIN SCALES - GENERAL: PAINLEVEL_OUTOF10: 6

## 2025-02-03 NOTE — PROGRESS NOTES
64-year-old female with history of obesity and hidradenitis suppurativa.  I have seen her in the past for wide excisional biopsy of hidradenitis involving the right and left buttock in the past.     Comes now for follow-up.  In general she is doing well but is noted some pain on the left inner thigh/buttock.     Have a flare of hidradenitis in the left proximal inner thigh.  There are small pustules but no obvious abscess that would require drainage.      Will prescribe doxycycline.  Recommend sitz bath's,      She does have an upcoming appointment with dermatology for consideration of a biologic     Follow-up with me in 6 months or as needed

## 2025-02-25 ENCOUNTER — APPOINTMENT (OUTPATIENT)
Dept: DERMATOLOGY | Facility: CLINIC | Age: 65
End: 2025-02-25
Payer: COMMERCIAL

## 2025-02-25 DIAGNOSIS — L73.2 HIDRADENITIS SUPPURATIVA: Primary | ICD-10-CM

## 2025-02-25 PROCEDURE — 1036F TOBACCO NON-USER: CPT | Performed by: DERMATOLOGY

## 2025-02-25 PROCEDURE — 99204 OFFICE O/P NEW MOD 45 MIN: CPT | Performed by: DERMATOLOGY

## 2025-02-25 PROCEDURE — 4010F ACE/ARB THERAPY RXD/TAKEN: CPT | Performed by: DERMATOLOGY

## 2025-02-25 PROCEDURE — 1160F RVW MEDS BY RX/DR IN RCRD: CPT | Performed by: DERMATOLOGY

## 2025-02-25 PROCEDURE — 1159F MED LIST DOCD IN RCRD: CPT | Performed by: DERMATOLOGY

## 2025-02-25 RX ORDER — CLINDAMYCIN PHOSPHATE 10 UG/ML
LOTION TOPICAL 2 TIMES DAILY
Qty: 60 ML | Refills: 11 | Status: SHIPPED | OUTPATIENT
Start: 2025-02-25

## 2025-02-25 RX ORDER — BENZOYL PEROXIDE 100 MG/ML
LIQUID TOPICAL DAILY
Qty: 227 G | Refills: 3 | Status: SHIPPED | OUTPATIENT
Start: 2025-02-25 | End: 2026-02-25

## 2025-02-25 ASSESSMENT — DERMATOLOGY PATIENT ASSESSMENT
ARE YOU TRYING TO GET PREGNANT: NO
ARE YOU AN ORGAN TRANSPLANT RECIPIENT: NO
DO YOU USE A TANNING BED: NO
HAVE YOU HAD OR DO YOU HAVE VASCULAR DISEASE: NO
DO YOU HAVE ANY NEW OR CHANGING LESIONS: YES
DO YOU HAVE IRREGULAR MENSTRUAL CYCLES: NO
HAVE YOU HAD OR DO YOU HAVE A STAPH INFECTION: NO
ARE YOU ON BIRTH CONTROL: NO

## 2025-02-25 ASSESSMENT — DERMATOLOGY QUALITY OF LIFE (QOL) ASSESSMENT
RATE HOW BOTHERED YOU ARE BY SYMPTOMS OF YOUR SKIN PROBLEM (EG, ITCHING, STINGING BURNING, HURTING OR SKIN IRRITATION): 0 - NEVER BOTHERED
RATE HOW EMOTIONALLY BOTHERED YOU ARE BY YOUR SKIN PROBLEM (FOR EXAMPLE, WORRY, EMBARRASSMENT, FRUSTRATION): 0 - NEVER BOTHERED
DATE THE QUALITY-OF-LIFE ASSESSMENT WAS COMPLETED: 67261
ARE THERE EXCLUSIONS OR EXCEPTIONS FOR THE QUALITY OF LIFE ASSESSMENT: NO
WHAT SINGLE SKIN CONDITION LISTED BELOW IS THE PATIENT ANSWERING THE QUALITY-OF-LIFE ASSESSMENT QUESTIONS ABOUT: HIDRADENITIS SUPPURATIVA
RATE HOW BOTHERED YOU ARE BY EFFECTS OF YOUR SKIN PROBLEMS ON YOUR ACTIVITIES (EG, GOING OUT, ACCOMPLISHING WHAT YOU WANT, WORK ACTIVITIES OR YOUR RELATIONSHIPS WITH OTHERS): 0 - NEVER BOTHERED

## 2025-02-25 ASSESSMENT — ITCH NUMERIC RATING SCALE: HOW SEVERE IS YOUR ITCHING?: 0

## 2025-02-25 ASSESSMENT — PATIENT GLOBAL ASSESSMENT (PGA): PATIENT GLOBAL ASSESSMENT: PATIENT GLOBAL ASSESSMENT:  1 - CLEAR

## 2025-02-25 NOTE — PROGRESS NOTES
Subjective     Kelsey Rai is a 64 y.o. female who presents for the following: Hidradenitis Suppurativa. Last derm visit 9/13/17 for hidradenitis suppurativa with Dr. Trinidad    Prior treatment: oral minocycline, topical clindamycin, benzoyl peroxide wash and IL-Kenalog injection, mutliple surgery with Dr. Lubin of buttocks, thighs, abdomen    Humira was discussed 2017 but she did not pursue  Minocycline was prescribed 8/2024 and Doxycycline was prescribed 2/3/25. Her labs for her renal function were checked in January    Review of Systems:  No other skin or systemic complaints other than what is documented elsewhere in the note.    The following portions of the chart were reviewed this encounter and updated as appropriate:   Tobacco  Allergies  Meds  Problems  Med Hx  Surg Hx         Skin Cancer History  No skin cancer on file.      Specialty Problems          Dermatology Problems    Hidradenitis suppurativa        Objective   Well appearing patient in no apparent distress; mood and affect are within normal limits.    A focused skin examination was performed. All findings within normal limits unless otherwise noted below.    Assessment/Plan   1. Hidradenitis suppurativa  Gluteal Crease, Left Inguinal Area, Left Medial Thigh, Right Inguinal Area, Right Medial Thigh  6 deep infiltrated draining nodules and sinuses. Several are large and made up of interconnected smaller lesions. Also areas of old scarring.     - Salinas stage 3  -Discussed nature of condition  -Discussed treatment options    - Prior treatment: oral minocycline, topical clindamycin, benzoyl peroxide wash and IL-Kenalog injection, mutliple surgery with Dr. Lubin of buttocks, thighs, abdomen  - Humira was discussed 2017 but she did not pursue  - Minocycline was prescribed 8/2024 and Doxycycline was prescribed 2/3/25. Her labs for her renal function were checked in January    Today:  - Recommend pursuing Humira due to failure of other  therapies but she is hesitant regarding risks and would like to avoid a regular injection if possible  - we reviewed the other treatment options again    - she would like to restart topicals as below with close follow up and then reconsider other therapies. Info for Humira in AVS    Related Procedures  Follow Up In Dermatology - Established Patient    Related Medications  benzoyl peroxide (PanoxyL) 10 % external wash  Apply topically once daily. To wash the affected areas    clindamycin (Cleocin T) 1 % lotion  Apply topically 2 times a day. To areas of boils immediately after bath and also a second time per day without the bath        Follow up 3 months hidradenitis suppurativa

## 2025-03-12 ENCOUNTER — OFFICE VISIT (OUTPATIENT)
Dept: CARDIOLOGY | Facility: HOSPITAL | Age: 65
End: 2025-03-12
Payer: COMMERCIAL

## 2025-03-12 VITALS
WEIGHT: 265 LBS | DIASTOLIC BLOOD PRESSURE: 72 MMHG | HEART RATE: 74 BPM | OXYGEN SATURATION: 99 % | HEIGHT: 67 IN | BODY MASS INDEX: 41.59 KG/M2 | SYSTOLIC BLOOD PRESSURE: 115 MMHG

## 2025-03-12 DIAGNOSIS — Z13.6 SCREENING FOR HEART DISEASE: ICD-10-CM

## 2025-03-12 DIAGNOSIS — E78.5 HYPERLIPIDEMIA, UNSPECIFIED HYPERLIPIDEMIA TYPE: ICD-10-CM

## 2025-03-12 DIAGNOSIS — I10 PRIMARY HYPERTENSION: ICD-10-CM

## 2025-03-12 DIAGNOSIS — I25.10 CORONARY ARTERY CALCIFICATION SEEN ON CT SCAN: Primary | Chronic | ICD-10-CM

## 2025-03-12 LAB
ATRIAL RATE: 74 BPM
P AXIS: 72 DEGREES
P OFFSET: 186 MS
P ONSET: 127 MS
PR INTERVAL: 190 MS
Q ONSET: 222 MS
QRS COUNT: 12 BEATS
QRS DURATION: 80 MS
QT INTERVAL: 382 MS
QTC CALCULATION(BAZETT): 424 MS
QTC FREDERICIA: 410 MS
R AXIS: 64 DEGREES
T AXIS: 48 DEGREES
T OFFSET: 413 MS
VENTRICULAR RATE: 74 BPM

## 2025-03-12 PROCEDURE — 4010F ACE/ARB THERAPY RXD/TAKEN: CPT | Performed by: INTERNAL MEDICINE

## 2025-03-12 PROCEDURE — 1159F MED LIST DOCD IN RCRD: CPT | Performed by: INTERNAL MEDICINE

## 2025-03-12 PROCEDURE — 3078F DIAST BP <80 MM HG: CPT | Performed by: INTERNAL MEDICINE

## 2025-03-12 PROCEDURE — 1036F TOBACCO NON-USER: CPT | Performed by: INTERNAL MEDICINE

## 2025-03-12 PROCEDURE — 99214 OFFICE O/P EST MOD 30 MIN: CPT | Performed by: INTERNAL MEDICINE

## 2025-03-12 PROCEDURE — 3074F SYST BP LT 130 MM HG: CPT | Performed by: INTERNAL MEDICINE

## 2025-03-12 PROCEDURE — 3008F BODY MASS INDEX DOCD: CPT | Performed by: INTERNAL MEDICINE

## 2025-03-12 PROCEDURE — 99204 OFFICE O/P NEW MOD 45 MIN: CPT | Performed by: INTERNAL MEDICINE

## 2025-03-12 PROCEDURE — 93005 ELECTROCARDIOGRAM TRACING: CPT | Performed by: INTERNAL MEDICINE

## 2025-03-12 NOTE — PROGRESS NOTES
"Chief Complaint:   No chief complaint on file.     History Of Present Illness:    Kelsey Rai is a 64 y.o. female here regarding noted CAD (severe coronary calcifications) on CT chest 7/2024. She has a history of hypertension, hyperlipidemia, diabetes mellitus type II, CKD stage III, RCC s/p nephrectomy, hidradenitis suppurativa, obesity class II, and former tobacco use (20 pack year, quit '18).    She denies cardiovascular complaints. She gets exercise through walking.        Last Recorded Vitals:  Vitals:    03/12/25 1308   BP: 115/72   BP Location: Left arm   Patient Position: Sitting   BP Cuff Size: Adult   Pulse: 74   SpO2: 99%   Weight: 120 kg (265 lb)   Height: 1.702 m (5' 7\")              Allergies:  Banana, Latex, and Penicillins    Outpatient Medications:  Current Outpatient Medications   Medication Instructions    aspirin 81 mg, oral, Daily    benzoyl peroxide (PanoxyL) 10 % external wash Topical, Daily, To wash the affected areas    blood sugar diagnostic (OneTouch Verio test strips) strip Test blood sugar 1-2 times daily.    blood-glucose meter (OneTouch Verio Meter) misc 1 each, miscellaneous, 2 times daily    cholecalciferol (VITAMIN D-3) 50 mcg, oral, Daily    clindamycin (Cleocin T) 1 % lotion Topical, 2 times daily, To areas of boils immediately after bath and also a second time per day without the bath    clotrimazole-betamethasone (Lotrisone) cream 1 Application, Topical, 2 times daily    dapagliflozin propanediol (FARXIGA) 10 mg, oral, Daily    docusate sodium (COLACE) 100 mg, oral, 2 times daily    fluconazole (Diflucan) 150 mg tablet Take 1 take once and repeat in 4 days if symptoms persist.    hydroCHLOROthiazide (HYDRODIURIL) 25 mg, oral, Daily    lancets (OneTouch Delica Plus Lancet) 33 gauge misc TEST BLOOD SUGAR TWO TIMES A DAY    lisinopril 10 mg, oral, Daily    rosuvastatin (CRESTOR) 40 mg, oral, Daily    Trulicity 4.5 mg/0.5 mL pen injector INJECT 4.5 MGS ONCE WEEKLY "         Physical Exam:  Gen Well nourished middle aged female sitting up in NAD. Body mass index is 41.5 kg/m².  Eyes sclera nonicteric. Pupils normal size and symmetric.  Neck supple with no masses or thyromegaly.  CV rrr. No m/r/g appreciated. No JVD or leg edema. No carotid bruits appreciated.  Pulm Lungs clear with normal respiratory effort.  Skin No bruises rashes or jaundice. No induration or nodules.  Psych Appropriate affect and mood. Normal judgement and insight.  Neuro Alert and conversant. Grossly nonfocal.       I reviewed the patient's ECG - NSR. PVC's.    I reviewed most recent imaging / labs / and office notes as well as details of any recent hospitalizations or ED visits.    Assessment/Plan   1.  Coronary artery disease  Subclinical. The severe coronary calcifications seen on her CT chest last year are an equivalent to a high coronary calcium score which is managed by lipid lowering therapy and monitoring for symptoms. She has no symptoms and she is on lipid lowering therapy with good LDL control. Her other risk factors (smoking, diabetes mellitus, hypertension) are sufficiently modified. Continued exercise was encouraged. She was instructed to closely monitor for ischemic symptoms and continue her present regimen.    2. Hypertension   BP well controlled. Her present regimen is to continue.     3. Hyperlipidemia   LDL well controlled. Her present regimen is to continue.         Follow-up stas Walker MD

## 2025-03-25 DIAGNOSIS — E11.9 TYPE 2 DIABETES MELLITUS WITHOUT COMPLICATION, WITH LONG-TERM CURRENT USE OF INSULIN: ICD-10-CM

## 2025-03-25 DIAGNOSIS — Z79.4 TYPE 2 DIABETES MELLITUS WITHOUT COMPLICATION, WITH LONG-TERM CURRENT USE OF INSULIN: ICD-10-CM

## 2025-03-25 RX ORDER — DAPAGLIFLOZIN 10 MG/1
10 TABLET, FILM COATED ORAL DAILY
Qty: 90 TABLET | Refills: 1 | OUTPATIENT
Start: 2025-03-25

## 2025-03-25 RX ORDER — DAPAGLIFLOZIN 10 MG/1
10 TABLET, FILM COATED ORAL DAILY
Qty: 90 TABLET | Refills: 1 | Status: SHIPPED | OUTPATIENT
Start: 2025-03-25

## 2025-04-03 DIAGNOSIS — E11.9 TYPE 2 DIABETES MELLITUS WITHOUT COMPLICATION, WITH LONG-TERM CURRENT USE OF INSULIN: ICD-10-CM

## 2025-04-03 DIAGNOSIS — Z79.4 TYPE 2 DIABETES MELLITUS WITHOUT COMPLICATION, WITH LONG-TERM CURRENT USE OF INSULIN: ICD-10-CM

## 2025-04-03 RX ORDER — DULAGLUTIDE 4.5 MG/.5ML
INJECTION, SOLUTION SUBCUTANEOUS
Qty: 6 ML | Refills: 3 | Status: SHIPPED | OUTPATIENT
Start: 2025-04-03

## 2025-06-11 ENCOUNTER — APPOINTMENT (OUTPATIENT)
Dept: DERMATOLOGY | Facility: CLINIC | Age: 65
End: 2025-06-11
Payer: COMMERCIAL

## 2025-06-11 DIAGNOSIS — L73.2 HIDRADENITIS SUPPURATIVA: ICD-10-CM

## 2025-06-11 PROCEDURE — 1036F TOBACCO NON-USER: CPT | Performed by: DERMATOLOGY

## 2025-06-11 PROCEDURE — 1159F MED LIST DOCD IN RCRD: CPT | Performed by: DERMATOLOGY

## 2025-06-11 PROCEDURE — 99214 OFFICE O/P EST MOD 30 MIN: CPT | Performed by: DERMATOLOGY

## 2025-06-11 PROCEDURE — 1160F RVW MEDS BY RX/DR IN RCRD: CPT | Performed by: DERMATOLOGY

## 2025-06-11 PROCEDURE — 4010F ACE/ARB THERAPY RXD/TAKEN: CPT | Performed by: DERMATOLOGY

## 2025-06-11 ASSESSMENT — DERMATOLOGY QUALITY OF LIFE (QOL) ASSESSMENT
RATE HOW BOTHERED YOU ARE BY EFFECTS OF YOUR SKIN PROBLEMS ON YOUR ACTIVITIES (EG, GOING OUT, ACCOMPLISHING WHAT YOU WANT, WORK ACTIVITIES OR YOUR RELATIONSHIPS WITH OTHERS): 0 - NEVER BOTHERED
ARE THERE EXCLUSIONS OR EXCEPTIONS FOR THE QUALITY OF LIFE ASSESSMENT: NO
DATE THE QUALITY-OF-LIFE ASSESSMENT WAS COMPLETED: 67367
RATE HOW BOTHERED YOU ARE BY SYMPTOMS OF YOUR SKIN PROBLEM (EG, ITCHING, STINGING BURNING, HURTING OR SKIN IRRITATION): 0 - NEVER BOTHERED
RATE HOW EMOTIONALLY BOTHERED YOU ARE BY YOUR SKIN PROBLEM (FOR EXAMPLE, WORRY, EMBARRASSMENT, FRUSTRATION): 0 - NEVER BOTHERED
WHAT SINGLE SKIN CONDITION LISTED BELOW IS THE PATIENT ANSWERING THE QUALITY-OF-LIFE ASSESSMENT QUESTIONS ABOUT: HIDRADENITIS SUPPURATIVA

## 2025-06-11 ASSESSMENT — DERMATOLOGY PATIENT ASSESSMENT
HAVE YOU HAD OR DO YOU HAVE VASCULAR DISEASE: NO
DO YOU HAVE ANY NEW OR CHANGING LESIONS: YES
DO YOU HAVE IRREGULAR MENSTRUAL CYCLES: NO
DO YOU USE A TANNING BED: NO
HAVE YOU HAD OR DO YOU HAVE A STAPH INFECTION: NO
ARE YOU TRYING TO GET PREGNANT: NO
ARE YOU ON BIRTH CONTROL: NO
ARE YOU AN ORGAN TRANSPLANT RECIPIENT: NO

## 2025-06-11 ASSESSMENT — PATIENT GLOBAL ASSESSMENT (PGA): PATIENT GLOBAL ASSESSMENT: PATIENT GLOBAL ASSESSMENT:  1 - CLEAR

## 2025-06-11 ASSESSMENT — ITCH NUMERIC RATING SCALE: HOW SEVERE IS YOUR ITCHING?: 0

## 2025-06-11 NOTE — PROGRESS NOTES
Subjective     Kelsey Rai is a 65 y.o. female who presents for the following: Hidradenitis Suppurativa. Last derm visit 2/25/25 for hidradenitis suppurativa.     Since last visit using the topicals which seemed to work transiently but then flared again.       Intake Questions  Do you have any new or changing Lesions?: Yes  Are you an organ transplant recipient?: No  Have you had or do you have a Staph Infection?: No  Have you had or do you have Vacular Disease?: No  Do you use sunscreen?: None  Do you use a tanning bed?: No  Are you trying to get pregnant?: No  Are you on birth control?: No  Do you have irregular menstrual cycles?: No    Review of Systems:  No other skin or systemic complaints other than what is documented elsewhere in the note.    The following portions of the chart were reviewed this encounter and updated as appropriate:   Tobacco  Allergies  Meds  Problems  Med Hx  Surg Hx         Skin Cancer History  Biopsy Log Book  No skin cancers from Specimen Tracking.    Additional History      Specialty Problems          Dermatology Problems    Hidradenitis suppurativa        Objective   Well appearing patient in no apparent distress; mood and affect are within normal limits.    A focused skin examination was performed. All findings within normal limits unless otherwise noted below.    Assessment/Plan   Skin Exam  1. HIDRADENITIS SUPPURATIVA  Gluteal Crease, Left Inguinal Area, Left Medial Thigh, Right Inguinal Area, Right Medial Thigh  6 deep infiltrated draining nodules and sinuses. Several are large and made up of interconnected smaller lesions. Also areas of old scarring.   - Salinas stage 3  - severe    - Prior treatment: oral minocycline, topical clindamycin, benzoyl peroxide wash and IL-Kenalog injection, mutliple surgery with Dr. Lubin of buttocks, thighs, abdomen  - Humira was discussed 2017 but she did not pursue  - Minocycline was prescribed 8/2024 and Doxycycline was prescribed  2/3/25. Her labs for her renal function were checked in January  - 2/25/25: Humira was discussed again but she was hesitant. Re-started topicals    Today:  - Not much improvement or change with topicals but we will continue  - discussed systemic again - if insurance covers cosentyx, maintenance period is weekly injections which she is more amenable to. Info in AVS. She will contact if interested and we will have to check labs  -  Also discussed surgery, she has seen Dr. Lubin before. Can send referral if she wants, she will consider    Related Procedures  Follow Up In Dermatology - Established Patient  Follow Up In Dermatology - Established Patient  Related Medications  benzoyl peroxide (PanoxyL) 10 % external wash  Apply topically once daily. To wash the affected areas  clindamycin (Cleocin T) 1 % lotion  Apply topically 2 times a day. To areas of boils immediately after bath and also a second time per day without the bath      Follow up 6 month hidradenitis suppurativa

## 2025-06-11 NOTE — Clinical Note
6 deep infiltrated draining nodules and sinuses. Several are large and made up of interconnected smaller lesions. Also areas of old scarring.

## 2025-06-11 NOTE — Clinical Note
- Salinas stage 3  - severe    - Prior treatment: oral minocycline, topical clindamycin, benzoyl peroxide wash and IL-Kenalog injection, mutliple surgery with Dr. Lubin of buttocks, thighs, abdomen  - Humira was discussed 2017 but she did not pursue  - Minocycline was prescribed 8/2024 and Doxycycline was prescribed 2/3/25. Her labs for her renal function were checked in January  - 2/25/25: Humira was discussed again but she was hesitant. Re-started topicals    Today:  - Not much improvement or change with topicals but we will continue  - discussed systemic again - if insurance covers cosentyx, maintenance period is weekly injections which she is more amenable to. Info in AVS. She will contact if interested and we will have to check labs  -  Also discussed surgery, she has seen Dr. Lubin before. Can send referral if she wants, she will consider

## 2025-06-25 ENCOUNTER — APPOINTMENT (OUTPATIENT)
Dept: PRIMARY CARE | Facility: CLINIC | Age: 65
End: 2025-06-25
Payer: COMMERCIAL

## 2025-06-25 VITALS
DIASTOLIC BLOOD PRESSURE: 77 MMHG | HEIGHT: 67 IN | OXYGEN SATURATION: 97 % | BODY MASS INDEX: 41.65 KG/M2 | HEART RATE: 92 BPM | SYSTOLIC BLOOD PRESSURE: 129 MMHG | WEIGHT: 265.4 LBS

## 2025-06-25 DIAGNOSIS — E78.5 HYPERLIPIDEMIA, UNSPECIFIED HYPERLIPIDEMIA TYPE: ICD-10-CM

## 2025-06-25 DIAGNOSIS — Z90.5 H/O RIGHT RADICAL NEPHRECTOMY: ICD-10-CM

## 2025-06-25 DIAGNOSIS — E11.9 TYPE 2 DIABETES MELLITUS WITHOUT COMPLICATION, WITH LONG-TERM CURRENT USE OF INSULIN: ICD-10-CM

## 2025-06-25 DIAGNOSIS — E66.813 CLASS 3 SEVERE OBESITY DUE TO EXCESS CALORIES WITH SERIOUS COMORBIDITY AND BODY MASS INDEX (BMI) OF 40.0 TO 44.9 IN ADULT: ICD-10-CM

## 2025-06-25 DIAGNOSIS — Z78.0 ASYMPTOMATIC MENOPAUSE: ICD-10-CM

## 2025-06-25 DIAGNOSIS — Z12.31 VISIT FOR SCREENING MAMMOGRAM: ICD-10-CM

## 2025-06-25 DIAGNOSIS — Z13.89 SCREENING FOR OBESITY: ICD-10-CM

## 2025-06-25 DIAGNOSIS — Z85.528 PERSONAL HISTORY OF RENAL CANCER: ICD-10-CM

## 2025-06-25 DIAGNOSIS — Z79.4 TYPE 2 DIABETES MELLITUS WITHOUT COMPLICATION, WITH LONG-TERM CURRENT USE OF INSULIN: ICD-10-CM

## 2025-06-25 DIAGNOSIS — Z00.00 MEDICARE ANNUAL WELLNESS VISIT, SUBSEQUENT: Primary | Chronic | ICD-10-CM

## 2025-06-25 DIAGNOSIS — I10 HYPERTENSION, UNSPECIFIED TYPE: ICD-10-CM

## 2025-06-25 PROCEDURE — 4010F ACE/ARB THERAPY RXD/TAKEN: CPT | Performed by: INTERNAL MEDICINE

## 2025-06-25 PROCEDURE — 1158F ADVNC CARE PLAN TLK DOCD: CPT | Performed by: INTERNAL MEDICINE

## 2025-06-25 PROCEDURE — 3008F BODY MASS INDEX DOCD: CPT | Performed by: INTERNAL MEDICINE

## 2025-06-25 PROCEDURE — 3074F SYST BP LT 130 MM HG: CPT | Performed by: INTERNAL MEDICINE

## 2025-06-25 PROCEDURE — 1160F RVW MEDS BY RX/DR IN RCRD: CPT | Performed by: INTERNAL MEDICINE

## 2025-06-25 PROCEDURE — 1123F ACP DISCUSS/DSCN MKR DOCD: CPT | Performed by: INTERNAL MEDICINE

## 2025-06-25 PROCEDURE — 1170F FXNL STATUS ASSESSED: CPT | Performed by: INTERNAL MEDICINE

## 2025-06-25 PROCEDURE — 99214 OFFICE O/P EST MOD 30 MIN: CPT | Performed by: INTERNAL MEDICINE

## 2025-06-25 PROCEDURE — 1159F MED LIST DOCD IN RCRD: CPT | Performed by: INTERNAL MEDICINE

## 2025-06-25 PROCEDURE — 3078F DIAST BP <80 MM HG: CPT | Performed by: INTERNAL MEDICINE

## 2025-06-25 PROCEDURE — 1036F TOBACCO NON-USER: CPT | Performed by: INTERNAL MEDICINE

## 2025-06-25 PROCEDURE — G0439 PPPS, SUBSEQ VISIT: HCPCS | Performed by: INTERNAL MEDICINE

## 2025-06-25 RX ORDER — DULAGLUTIDE 4.5 MG/.5ML
4.5 INJECTION, SOLUTION SUBCUTANEOUS
Qty: 6 ML | Refills: 3 | Status: SHIPPED | OUTPATIENT
Start: 2025-06-25

## 2025-06-25 RX ORDER — DAPAGLIFLOZIN 10 MG/1
10 TABLET, FILM COATED ORAL DAILY
Qty: 90 TABLET | Refills: 1 | Status: SHIPPED | OUTPATIENT
Start: 2025-06-25

## 2025-06-25 RX ORDER — HYDROCHLOROTHIAZIDE 25 MG/1
25 TABLET ORAL DAILY
Qty: 90 TABLET | Refills: 1 | Status: SHIPPED | OUTPATIENT
Start: 2025-06-25

## 2025-06-25 RX ORDER — ROSUVASTATIN CALCIUM 40 MG/1
40 TABLET, COATED ORAL DAILY
Qty: 90 TABLET | Refills: 1 | Status: SHIPPED | OUTPATIENT
Start: 2025-06-25 | End: 2025-12-22

## 2025-06-25 RX ORDER — LISINOPRIL 10 MG/1
10 TABLET ORAL DAILY
Qty: 90 TABLET | Refills: 1 | Status: SHIPPED | OUTPATIENT
Start: 2025-06-25

## 2025-06-25 ASSESSMENT — ACTIVITIES OF DAILY LIVING (ADL)
GROCERY_SHOPPING: INDEPENDENT
DRESSING: INDEPENDENT
BATHING: INDEPENDENT
MANAGING_FINANCES: INDEPENDENT
DOING_HOUSEWORK: INDEPENDENT
TAKING_MEDICATION: INDEPENDENT

## 2025-06-25 ASSESSMENT — ENCOUNTER SYMPTOMS
OCCASIONAL FEELINGS OF UNSTEADINESS: 0
CONSTITUTIONAL NEGATIVE: 1
LOSS OF SENSATION IN FEET: 0
DEPRESSION: 0

## 2025-06-25 ASSESSMENT — PATIENT HEALTH QUESTIONNAIRE - PHQ9
1. LITTLE INTEREST OR PLEASURE IN DOING THINGS: NOT AT ALL
2. FEELING DOWN, DEPRESSED OR HOPELESS: NOT AT ALL
2. FEELING DOWN, DEPRESSED OR HOPELESS: NOT AT ALL
SUM OF ALL RESPONSES TO PHQ9 QUESTIONS 1 AND 2: 0
SUM OF ALL RESPONSES TO PHQ9 QUESTIONS 1 AND 2: 0
1. LITTLE INTEREST OR PLEASURE IN DOING THINGS: NOT AT ALL

## 2025-06-25 NOTE — PROGRESS NOTES
"Patient ID: Kelsey Rai is a 65 y.o. female who presents for New Patient Visit (NPV), Medicare Annual Wellness Visit Subsequent, and Establish Care.    /77 (BP Location: Right arm, Patient Position: Sitting, BP Cuff Size: Large adult)   Pulse 92   Ht 1.702 m (5' 7\")   Wt 120 kg (265 lb 6.4 oz)   SpO2 97%   BMI 41.57 kg/m²     HPI      Patient has personal history of right renal cell cancer  Diagnosed 2019, had total radical nephrectomy nephrectomy  Doing fine  Follow-up with oncology/urology      Patient is having slight discomfort around the right flank  No lower back pain  Pain does not radiate to the from loin to groin  He does she does not have any right upper abdominal pain  No nausea, no vomiting  No radiation to the posterior scapular blade  No relationship to food  The discomfort is minimal  It is sporadic it is not affecting her daily chores  She feels like it could be a muscular pull      Hypertension on medications controlled  No chest pain, no shortness of breath, no PND, no orthopnea,  No leg swelling, no palpitation, no headache,      She is morbidly obese  She does not have any apnea  She does not have any snoring  No gasping or choking for air at night  No daytime somnolence  No a.m. fatigue  No AM headache      She has diabetes type 2  On Farxiga 10 mg  Add on Trulicity 4.5 mg once a week  Last A1c on 1/3/2025 6.4  No tingling in feet  No numbness in feet  No blurry vision  No double vision      Subjective     Review of Systems   Constitutional: Negative.    All other systems reviewed and are negative.      Objective     Physical Exam  Vitals and nursing note reviewed.   Constitutional:       Appearance: Normal appearance. She is obese.   Neck:      Vascular: No carotid bruit.   Cardiovascular:      Rate and Rhythm: Normal rate and regular rhythm.      Pulses: Normal pulses.      Heart sounds: Normal heart sounds. No murmur heard.  Pulmonary:      Effort: Pulmonary effort is normal. "      Breath sounds: Normal breath sounds.   Abdominal:      Palpations: There is no mass.      Tenderness: There is no abdominal tenderness. There is no guarding or rebound.   Musculoskeletal:      Right lower leg: No edema.      Left lower leg: No edema.   Skin:     Capillary Refill: Capillary refill takes more than 3 seconds.   Neurological:      General: No focal deficit present.      Mental Status: She is oriented to person, place, and time. Mental status is at baseline.   Psychiatric:         Mood and Affect: Mood normal.         Behavior: Behavior normal.         Thought Content: Thought content normal.         Judgment: Judgment normal.         Lab Results   Component Value Date    WBC 7.7 01/29/2025    HGB 13.2 01/29/2025    HCT 40.4 01/29/2025    MCV 92.4 01/29/2025     01/29/2025           Problem List Items Addressed This Visit       Diabetes mellitus (Multi)    Relevant Medications    dapagliflozin propanediol (Farxiga) 10 mg tablet    dulaglutide (Trulicity) 4.5 mg/0.5 mL pen injector    Hyperlipidemia    Relevant Medications    rosuvastatin (Crestor) 40 mg tablet    Hypertension    Relevant Medications    hydroCHLOROthiazide (HYDRODiuril) 25 mg tablet    lisinopril 10 mg tablet    H/O right radical nephrectomy    Personal history of renal cancer    Class 3 severe obesity due to excess calories with serious comorbidity and body mass index (BMI) of 40.0 to 44.9 in adult    Screening for obesity     Other Visit Diagnoses         Medicare annual wellness visit, subsequent  (Chronic)   -  Primary      Visit for screening mammogram        Relevant Orders    BI mammo bilateral screening tomosynthesis      Asymptomatic menopause        Relevant Orders    XR DEXA bone density               A/P           DEXA SCAN   MAMMOGRAM   Farxiga 10 mg every day filled  Trulicity 4.5 mg / 0.5 mL pen injector once weekly filled  Hydrochlorothiazide 25 mg 1 tablet every day filled  Lisinopril 10 mg 1 tablet every day  filled  Rosuvastatin 40 mg 1 tablet every day filled  Discussed with the patient the effect of morbid obesity and overall all-cause mortality  Discussed with the patient the effect of morbid obesity and cardiovascular cerebrovascular health  Discussed with the patient the effect of morbid obesity on hypertension, diabetes, obstructive sleep apnea fatal arrhythmias and sudden death  Discussed with the patient the effect of morbid obesity on cancer/malignancy, chronic kidney health, chronic pain  Advised her to cut back total calories intake and total portion size              Advance Care Planning Note     Discussion Date: 06/25/25   Discussion Participants: patient    The patient wishes to discuss Advance Care Planning today and the following is a brief summary of our discussion.     Patient has capacity to make their own medical decisions: Yes  Health Care Agent/Surrogate Decision Maker documented in chart: Yes    Documents on file and valid:  Advance Directive/Living Will: No   Health Care Power of : Yes  Other:     Communication of Medical Status/Prognosis:        Communication of Treatment Goals/Options:     Discussed with the patient end-of-life choices patient is presently full code she does not have a living will or healthcare power of  she will think about getting it done when ready will bring it on next office visit so that it can be scanned into the chart for the purpose of documentation    Treatment Decisions  Goals of Care: survival is paramount regardless of prognosis, treatment outcome, or burden       Follow Up Plan      Team Members      Time Statement: Total face to face time spent on advance care planning was 5 minutes with 5 minutes spent in counseling, including the explanation.    Priyank Dowell MD  6/25/2025 11:45 AM

## 2025-07-18 ENCOUNTER — APPOINTMENT (OUTPATIENT)
Dept: HEMATOLOGY/ONCOLOGY | Facility: CLINIC | Age: 65
End: 2025-07-18
Payer: COMMERCIAL

## 2025-07-21 PROCEDURE — 83540 ASSAY OF IRON: CPT

## 2025-07-21 PROCEDURE — 82728 ASSAY OF FERRITIN: CPT

## 2025-07-21 PROCEDURE — 82607 VITAMIN B-12: CPT

## 2025-07-21 PROCEDURE — 82746 ASSAY OF FOLIC ACID SERUM: CPT

## 2025-07-21 PROCEDURE — 80053 COMPREHEN METABOLIC PANEL: CPT

## 2025-07-21 PROCEDURE — 85025 COMPLETE CBC W/AUTO DIFF WBC: CPT

## 2025-07-21 PROCEDURE — 83615 LACTATE (LD) (LDH) ENZYME: CPT

## 2025-07-21 PROCEDURE — 83550 IRON BINDING TEST: CPT

## 2025-07-22 ENCOUNTER — LAB (OUTPATIENT)
Dept: LAB | Facility: HOSPITAL | Age: 65
End: 2025-07-22
Payer: COMMERCIAL

## 2025-07-22 DIAGNOSIS — C64.1 MALIGNANT NEOPLASM OF RIGHT KIDNEY, EXCEPT RENAL PELVIS: Primary | ICD-10-CM

## 2025-07-22 LAB
ALBUMIN SERPL BCP-MCNC: 3.6 G/DL (ref 3.4–5)
ALP SERPL-CCNC: 53 U/L (ref 33–136)
ALT SERPL W P-5'-P-CCNC: 22 U/L (ref 7–45)
ANION GAP SERPL CALC-SCNC: 12 MMOL/L (ref 10–20)
AST SERPL W P-5'-P-CCNC: 23 U/L (ref 9–39)
BASOPHILS # BLD AUTO: 0.03 X10*3/UL (ref 0–0.1)
BASOPHILS NFR BLD AUTO: 0.4 %
BILIRUB SERPL-MCNC: 0.4 MG/DL (ref 0–1.2)
BUN SERPL-MCNC: 22 MG/DL (ref 6–23)
CALCIUM SERPL-MCNC: 9 MG/DL (ref 8.6–10.6)
CHLORIDE SERPL-SCNC: 103 MMOL/L (ref 98–107)
CO2 SERPL-SCNC: 28 MMOL/L (ref 21–32)
CREAT SERPL-MCNC: 1.22 MG/DL (ref 0.5–1.05)
EGFRCR SERPLBLD CKD-EPI 2021: 49 ML/MIN/1.73M*2
EOSINOPHIL # BLD AUTO: 0.25 X10*3/UL (ref 0–0.7)
EOSINOPHIL NFR BLD AUTO: 3.1 %
ERYTHROCYTE [DISTWIDTH] IN BLOOD BY AUTOMATED COUNT: 14.6 % (ref 11.5–14.5)
FERRITIN SERPL-MCNC: 40 NG/ML (ref 8–150)
FOLATE SERPL-MCNC: 7.5 NG/ML
GLUCOSE SERPL-MCNC: 110 MG/DL (ref 74–99)
HCT VFR BLD AUTO: 42.2 % (ref 36–46)
HGB BLD-MCNC: 13 G/DL (ref 12–16)
HOLD SPECIMEN: NORMAL
IMM GRANULOCYTES # BLD AUTO: 0.01 X10*3/UL (ref 0–0.7)
IMM GRANULOCYTES NFR BLD AUTO: 0.1 % (ref 0–0.9)
IRON SATN MFR SERPL: 13 % (ref 25–45)
IRON SERPL-MCNC: 42 UG/DL (ref 35–150)
LDH SERPL L TO P-CCNC: 157 U/L (ref 84–246)
LYMPHOCYTES # BLD AUTO: 2.51 X10*3/UL (ref 1.2–4.8)
LYMPHOCYTES NFR BLD AUTO: 31.1 %
MCH RBC QN AUTO: 29.7 PG (ref 26–34)
MCHC RBC AUTO-ENTMCNC: 30.8 G/DL (ref 32–36)
MCV RBC AUTO: 97 FL (ref 80–100)
MONOCYTES # BLD AUTO: 0.62 X10*3/UL (ref 0.1–1)
MONOCYTES NFR BLD AUTO: 7.7 %
NEUTROPHILS # BLD AUTO: 4.66 X10*3/UL (ref 1.2–7.7)
NEUTROPHILS NFR BLD AUTO: 57.6 %
NRBC BLD-RTO: 0 /100 WBCS (ref 0–0)
PLATELET # BLD AUTO: 250 X10*3/UL (ref 150–450)
POTASSIUM SERPL-SCNC: 4.1 MMOL/L (ref 3.5–5.3)
PROT SERPL-MCNC: 7.1 G/DL (ref 6.4–8.2)
RBC # BLD AUTO: 4.37 X10*6/UL (ref 4–5.2)
SODIUM SERPL-SCNC: 139 MMOL/L (ref 136–145)
TIBC SERPL-MCNC: 314 UG/DL (ref 240–445)
UIBC SERPL-MCNC: 272 UG/DL (ref 110–370)
VIT B12 SERPL-MCNC: 463 PG/ML (ref 211–911)
WBC # BLD AUTO: 8.1 X10*3/UL (ref 4.4–11.3)

## 2025-07-23 ENCOUNTER — HOSPITAL ENCOUNTER (OUTPATIENT)
Dept: RADIOLOGY | Facility: CLINIC | Age: 65
Discharge: HOME | End: 2025-07-23
Payer: COMMERCIAL

## 2025-07-23 ENCOUNTER — APPOINTMENT (OUTPATIENT)
Dept: PRIMARY CARE | Facility: CLINIC | Age: 65
End: 2025-07-23
Payer: COMMERCIAL

## 2025-07-23 DIAGNOSIS — C64.1 RENAL CELL CARCINOMA, RIGHT: ICD-10-CM

## 2025-07-23 PROCEDURE — 71260 CT THORAX DX C+: CPT

## 2025-07-23 PROCEDURE — 71260 CT THORAX DX C+: CPT | Performed by: RADIOLOGY

## 2025-07-23 PROCEDURE — 74177 CT ABD & PELVIS W/CONTRAST: CPT | Performed by: RADIOLOGY

## 2025-07-23 PROCEDURE — 2550000001 HC RX 255 CONTRASTS: Performed by: INTERNAL MEDICINE

## 2025-07-23 RX ADMIN — IOHEXOL 75 ML: 350 INJECTION, SOLUTION INTRAVENOUS at 09:37

## 2025-07-31 ENCOUNTER — OFFICE VISIT (OUTPATIENT)
Dept: HEMATOLOGY/ONCOLOGY | Facility: CLINIC | Age: 65
End: 2025-07-31
Payer: COMMERCIAL

## 2025-07-31 VITALS
WEIGHT: 261.25 LBS | SYSTOLIC BLOOD PRESSURE: 120 MMHG | DIASTOLIC BLOOD PRESSURE: 77 MMHG | TEMPERATURE: 97 F | OXYGEN SATURATION: 97 % | RESPIRATION RATE: 18 BRPM | BODY MASS INDEX: 40.92 KG/M2 | HEART RATE: 80 BPM

## 2025-07-31 DIAGNOSIS — C64.1 RENAL CELL CARCINOMA, RIGHT: Primary | ICD-10-CM

## 2025-07-31 PROCEDURE — 4010F ACE/ARB THERAPY RXD/TAKEN: CPT | Performed by: INTERNAL MEDICINE

## 2025-07-31 PROCEDURE — 3074F SYST BP LT 130 MM HG: CPT | Performed by: INTERNAL MEDICINE

## 2025-07-31 PROCEDURE — 3078F DIAST BP <80 MM HG: CPT | Performed by: INTERNAL MEDICINE

## 2025-07-31 PROCEDURE — 99215 OFFICE O/P EST HI 40 MIN: CPT | Performed by: INTERNAL MEDICINE

## 2025-07-31 PROCEDURE — 1159F MED LIST DOCD IN RCRD: CPT | Performed by: INTERNAL MEDICINE

## 2025-07-31 PROCEDURE — 1126F AMNT PAIN NOTED NONE PRSNT: CPT | Performed by: INTERNAL MEDICINE

## 2025-07-31 PROCEDURE — 1160F RVW MEDS BY RX/DR IN RCRD: CPT | Performed by: INTERNAL MEDICINE

## 2025-07-31 RX ORDER — FERROUS SULFATE 325(65) MG
325 TABLET ORAL EVERY OTHER DAY
Qty: 90 TABLET | Refills: 0 | Status: SHIPPED | OUTPATIENT
Start: 2025-07-31 | End: 2026-01-27

## 2025-07-31 ASSESSMENT — PAIN SCALES - GENERAL: PAINLEVEL_OUTOF10: 0-NO PAIN

## 2025-07-31 NOTE — PROGRESS NOTES
Patient here with her son for follow up with Dr. Krueger and to review CT scan from 7/23/25.    Patient reports she is feeling well overall. She denies pain, nausea, vomiting, diarrhea, constipation, difficulty eating or weight loss.    Per orders patient to follow up with PCP and oncology as needed.     Patient verbalized understanding using the teach back method, no further questions at this time.

## 2025-07-31 NOTE — PROGRESS NOTES
Patient ID: Kelsey Rai is a 65 y.o. female.  Referring Physician: Anupam Paniagua MD  Office Address Unavailable  as of 7/25/2025  Primary Care Provider: Priyank Dowell MD    CANCER HISTORY:   Treatment History: Transfer of Care from Dr. Gudino      4/19 R renal mass, small pulm nodules, abscess  5/19 R lap nephrectomy - pT1bNx     On surveillance     History of Present Illness:      ID Statement:    KELSEY RAI is a 63 year old Female        Interval History:    doing well without complaints      No family history on file.  Oncology History    No history exists.       Kelsey Rai  reports that she quit smoking about 7 years ago. Her smoking use included cigarettes. She started smoking about 47 years ago. She has a 20 pack-year smoking history. She has never used smokeless tobacco.  She  reports no history of alcohol use.  She  reports current drug use. Drug: Marijuana.    ROS:  no ha, visual symptoms, hearing loss  no sob, chest pain, palp  ROS o/w non contributory, please see HPI    Objective   BSA: 2.36 meters squared  /77 (BP Location: Left arm, Patient Position: Sitting, BP Cuff Size: Large adult)   Pulse 80   Temp 36.1 °C (97 °F) (Temporal)   Resp 18   Wt 118 kg (261 lb 3.9 oz)   SpO2 97%   BMI 40.92 kg/m²     PHYSICAL EXAM:  NAD, awake/alert  HEENT, NCAT, OP clear, no oral lesions  CTA bilat  RRR no mgr  Abd soft/nt/nd+bs  No c/c/e/ttp  Motor/sensory intact, CN 2-12 intact    LABS:  Lab Results   Component Value Date    WBC 8.1 07/21/2025    HGB 13.0 07/21/2025    HCT 42.2 07/21/2025    MCV 97 07/21/2025     07/21/2025     Lab Results   Component Value Date    GLUCOSE 110 (H) 07/21/2025    CALCIUM 9.0 07/21/2025     07/21/2025    K 4.1 07/21/2025    CO2 28 07/21/2025     07/21/2025    BUN 22 07/21/2025    CREATININE 1.22 (H) 07/21/2025     Lab Results   Component Value Date    ALT 22 07/21/2025    AST 23 07/21/2025    ALKPHOS 53 07/21/2025    BILITOT 0.4  "07/21/2025     Lab Results   Component Value Date    TSH 0.76 01/29/2025     Lab Results   Component Value Date    IRON 42 07/21/2025    TIBC 314 07/21/2025    FERRITIN 40 07/21/2025     No results found for: \"PSA\"  Lab Results   Component Value Date     07/21/2025       IMAGING:  CT chest abdomen pelvis w IV contrast    Result Date: 7/15/2024  Interpreted By:  Rasheed White, STUDY: CT CHEST ABDOMEN PELVIS W IV CONTRAST;  7/15/2024 10:04 am   INDICATION: Signs/Symptoms: rencal cancer staging  C64.1: Renal cell carcinoma of right kidney.   COMPARISON: Abdomen and pelvic CT scan dated 07/10/2023. Chest CT scan dated 12/01/2020.           CHEST: 1. No intrathoracic metastatic disease. 2. Stable to slightly more conspicuous nonspecific bilateral lower lobe basal segment prominent interstitial airspace opacities and fibrotic changes could be related to chronic changes. 3. Severe coronary artery calcifications. Trace pericardial effusion.   ABDOMEN-PELVIS: 1. Status post right radical nephrectomy with no definite locoregional recurrence or metastatic disease in the abdomen or pelvis.       7/18/23 Mammo - BIRADS 1      DEXA: wnl    Assessment/Plan    62 yo woman 4/19 R renal mass, small pulm nodules, abscess  5/19 R lap nephrectomy - pT1bNx     On surveillance     7/25 CT a/p neg     no further surveillance after 5 yrs per NCCN guidelines and stage 1 RCC - can follow up with PMD and as needed with oncology or with survivorship thereafter    I spent 30 minutes in evaluation and treatment of this patient during this visit      Pati Ponce MD                         "

## 2025-08-04 ENCOUNTER — DOCUMENTATION (OUTPATIENT)
Dept: HEMATOLOGY/ONCOLOGY | Facility: CLINIC | Age: 65
End: 2025-08-04
Payer: COMMERCIAL

## 2025-08-04 ENCOUNTER — OFFICE VISIT (OUTPATIENT)
Dept: SURGERY | Facility: CLINIC | Age: 65
End: 2025-08-04
Payer: COMMERCIAL

## 2025-08-04 VITALS
WEIGHT: 264.2 LBS | SYSTOLIC BLOOD PRESSURE: 121 MMHG | HEIGHT: 68 IN | DIASTOLIC BLOOD PRESSURE: 80 MMHG | BODY MASS INDEX: 40.04 KG/M2 | HEART RATE: 76 BPM

## 2025-08-04 DIAGNOSIS — L73.2 HIDRADENITIS SUPPURATIVA: Primary | ICD-10-CM

## 2025-08-04 PROCEDURE — 99213 OFFICE O/P EST LOW 20 MIN: CPT | Performed by: SURGERY

## 2025-08-04 PROCEDURE — 3079F DIAST BP 80-89 MM HG: CPT | Performed by: SURGERY

## 2025-08-04 PROCEDURE — 4010F ACE/ARB THERAPY RXD/TAKEN: CPT | Performed by: SURGERY

## 2025-08-04 PROCEDURE — 1125F AMNT PAIN NOTED PAIN PRSNT: CPT | Performed by: SURGERY

## 2025-08-04 PROCEDURE — 3008F BODY MASS INDEX DOCD: CPT | Performed by: SURGERY

## 2025-08-04 PROCEDURE — 1159F MED LIST DOCD IN RCRD: CPT | Performed by: SURGERY

## 2025-08-04 PROCEDURE — 3074F SYST BP LT 130 MM HG: CPT | Performed by: SURGERY

## 2025-08-04 ASSESSMENT — ENCOUNTER SYMPTOMS
OCCASIONAL FEELINGS OF UNSTEADINESS: 0
DEPRESSION: 0
LOSS OF SENSATION IN FEET: 0

## 2025-08-04 ASSESSMENT — PAIN SCALES - GENERAL: PAINLEVEL_OUTOF10: 6

## 2025-08-04 NOTE — PROGRESS NOTES
"Left v for patient that her appointment on 8/6/265 with Linda Aguirre has been cancelled. Per recetn visit wi Dr. Ponce \"no further surveillance after 5 yrs per NCCN guidelines and stage 1 RCC - can follow up with PMD and as needed with oncology or with survivorship thereafter.\" Instructed to call back if any questions or concerns. Linda Aguirre aware.     "

## 2025-08-04 NOTE — PROGRESS NOTES
64-year-old female with history of obesity and hidradenitis suppurativa.  I have seen her in the past for wide excisional biopsy of hidradenitis involving the right and left buttock in the past.     Comes now for follow-up.  In general she is doing well but is noted some pain on the left inner thigh/buttock.     Have a flare of hidradenitis in the medial aspect of her left buttock.   There is slight induration,  small pustules but no obvious phlegmon or  abscess that would require drainage.      Has seen dermatology who offered Cocentyx. Patient reluctant to         Agreed with  dermatology for consideration of a biologic     Follow-up with me in 6 months or as needed

## 2025-08-06 ENCOUNTER — APPOINTMENT (OUTPATIENT)
Dept: HEMATOLOGY/ONCOLOGY | Facility: CLINIC | Age: 65
End: 2025-08-06
Payer: COMMERCIAL

## 2025-08-08 ENCOUNTER — APPOINTMENT (OUTPATIENT)
Dept: HEMATOLOGY/ONCOLOGY | Facility: CLINIC | Age: 65
End: 2025-08-08
Payer: COMMERCIAL

## 2025-09-05 ENCOUNTER — HOSPITAL ENCOUNTER (OUTPATIENT)
Dept: RADIOLOGY | Facility: CLINIC | Age: 65
Discharge: HOME | End: 2025-09-05
Payer: COMMERCIAL

## 2025-09-05 VITALS — HEIGHT: 68 IN | BODY MASS INDEX: 40.01 KG/M2 | WEIGHT: 264 LBS

## 2025-09-05 DIAGNOSIS — Z78.0 ASYMPTOMATIC MENOPAUSE: ICD-10-CM

## 2025-09-05 DIAGNOSIS — Z12.31 VISIT FOR SCREENING MAMMOGRAM: ICD-10-CM

## 2025-09-05 PROCEDURE — 77080 DXA BONE DENSITY AXIAL: CPT

## 2025-09-05 PROCEDURE — 77063 BREAST TOMOSYNTHESIS BI: CPT

## 2026-01-06 ENCOUNTER — APPOINTMENT (OUTPATIENT)
Dept: DERMATOLOGY | Facility: CLINIC | Age: 66
End: 2026-01-06
Payer: COMMERCIAL

## (undated) DEVICE — GLOVE, SURGICAL, PROTEXIS PI W/NEU-THERA, 8.0, PF, LF

## (undated) DEVICE — PENCIL, SMOKE EVACUATION, VALLEYLAB

## (undated) DEVICE — GLOVE, SURGICAL, PROTEXIS PI MICRO, 7.5, PF, LF

## (undated) DEVICE — SUTURE, VICRYL, 3-0, 27 IN, SH

## (undated) DEVICE — SUTURE, MONOCRYL, 4-0, 18 IN, PS2, UNDYED

## (undated) DEVICE — STAPLER, SKIN PROXIMATE, 35 WIDE

## (undated) DEVICE — Device

## (undated) DEVICE — ELECTRODE, ELECTROSURGICAL, BLADE, INSULATED, ENT/IMA, STERILE

## (undated) DEVICE — TOWEL, SURGICAL, NEURO, O/R, 16 X 26, BLUE, STERILE

## (undated) DEVICE — DRAIN, PENROSE, 0.25 X 12 IN, LF

## (undated) DEVICE — DRAPE, SHEET, ENDOSCOPY, GENERAL, FENESTRATED, ARMBOARD COVER, 98 X 123.5 IN, DISPOSABLE, LF, STERILE